# Patient Record
Sex: FEMALE | Race: WHITE | Employment: FULL TIME | URBAN - METROPOLITAN AREA
[De-identification: names, ages, dates, MRNs, and addresses within clinical notes are randomized per-mention and may not be internally consistent; named-entity substitution may affect disease eponyms.]

---

## 2019-11-21 RX ORDER — ENALAPRIL MALEATE 5 MG/1
5 TABLET ORAL DAILY
Qty: 90 TABLET | Refills: 1 | Status: SHIPPED | OUTPATIENT
Start: 2019-11-21 | End: 2019-11-26

## 2019-11-21 NOTE — TELEPHONE ENCOUNTER
A refill request was received for:  Requested Prescriptions     Pending Prescriptions Disp Refills   • Enalapril Maleate 5 MG Oral Tab  0     Sig: Take 1 tablet (5 mg total) by mouth daily.      Last refill date: Not not file  Last office visit: pt has not

## 2019-11-26 ENCOUNTER — TELEPHONE (OUTPATIENT)
Dept: FAMILY MEDICINE CLINIC | Facility: CLINIC | Age: 69
End: 2019-11-26

## 2019-11-26 RX ORDER — ENALAPRIL MALEATE 5 MG/1
5 TABLET ORAL DAILY
Qty: 90 TABLET | Refills: 1 | Status: SHIPPED | OUTPATIENT
Start: 2019-11-26 | End: 2019-11-26

## 2019-11-26 RX ORDER — ENALAPRIL MALEATE 5 MG/1
5 TABLET ORAL DAILY
Qty: 90 TABLET | Refills: 3 | Status: SHIPPED | OUTPATIENT
Start: 2019-11-26 | End: 2020-07-28

## 2019-11-26 RX ORDER — METOPROLOL SUCCINATE 25 MG/1
25 TABLET, EXTENDED RELEASE ORAL DAILY
Qty: 90 TABLET | Refills: 3 | Status: SHIPPED | OUTPATIENT
Start: 2019-11-26 | End: 2020-07-28

## 2019-11-26 NOTE — TELEPHONE ENCOUNTER
Pt requesting med to be sent to [facility] on [address] in [place] [address]. Pt also requesting refill on:    Metoprolol Succinate ER 25 mg oral tablet, extended release  1 po qd    Pt states she always gets Enalapril and Metoprolol filled at the same time.  Med verified w

## 2019-11-26 NOTE — TELEPHONE ENCOUNTER
Patient left message on answering service saying [facility] is contacting to do a refill on her B/P and heard meds.

## 2020-01-03 ENCOUNTER — TELEPHONE (OUTPATIENT)
Dept: FAMILY MEDICINE CLINIC | Facility: CLINIC | Age: 70
End: 2020-01-03

## 2020-01-03 DIAGNOSIS — E11.9 TYPE 2 DIABETES MELLITUS WITHOUT COMPLICATION, WITHOUT LONG-TERM CURRENT USE OF INSULIN (HCC): Primary | ICD-10-CM

## 2020-01-03 NOTE — TELEPHONE ENCOUNTER
Per [facility] pt's LOV with [facility] was 6/18/2019. Pt had labs drawn on 6/21/2019 and pt's HgbA1C was 7.1.       [facility] prescribed Metformin for pt on 7/16/2019 with orders for pt to take: \"Metformin 500mg qd 30d, hen BID 30d, 1 po q am and 2 PO q PM with rep

## 2020-01-03 NOTE — TELEPHONE ENCOUNTER
Shanda Rodriguez from [facility] stated pt Rx Metformin should have said take 1 in the morning and 2 in the evening but the rx said take 1 in the morning and 1 in the evening so pt is running low on metformin.  Need new RX

## 2020-01-03 NOTE — TELEPHONE ENCOUNTER
Brooklyn Nguyễn, DO  You 1 hour ago (12:24 PM)      Please send metformin for 3 daily for 90 days.  Pt needs to come in for appt/blood     Message left for pt to call back to make an appt and to come in for f/u labs.

## 2020-01-20 RX ORDER — LEVOTHYROXINE SODIUM 112 UG/1
112 TABLET ORAL
COMMUNITY
Start: 2019-11-22

## 2020-06-11 NOTE — TELEPHONE ENCOUNTER
A refill request was received for:  Requested Prescriptions     Pending Prescriptions Disp Refills   • METFORMIN HCL 1000 MG Oral Tab [Pharmacy Med Name: METFORMIN HCL 1,000 MG TABLET] 60 tablet 0     Sig: TAKE 1 TABLET BY MOUTH TWICE A DAY WITH MEALS

## 2020-07-06 NOTE — TELEPHONE ENCOUNTER
A refill request was received for:  Requested Prescriptions     Pending Prescriptions Disp Refills   • metFORMIN HCl 1000 MG Oral Tab 90 tablet 0     Sig: Take 1 tablet (1,000 mg total) by mouth 2 (two) times daily with meals.      Last refill date:  06/11/

## 2020-07-27 ENCOUNTER — TELEPHONE (OUTPATIENT)
Dept: OBGYN CLINIC | Facility: CLINIC | Age: 70
End: 2020-07-27

## 2020-07-27 ENCOUNTER — TELEPHONE (OUTPATIENT)
Dept: FAMILY MEDICINE CLINIC | Facility: CLINIC | Age: 70
End: 2020-07-27

## 2020-07-27 NOTE — TELEPHONE ENCOUNTER
Called pt and informed of refill 30 day supply, provided telephone appointment for July 28 due to +COVID  Pt verbalized understanding.       Last visit 06/18/2019 15[address]

## 2020-07-27 NOTE — TELEPHONE ENCOUNTER
Pt is calling to let Dr Venus Alpers know that she tested positive for covid she states she doesn't really have the symptoms

## 2020-07-27 NOTE — TELEPHONE ENCOUNTER
Called pt and +COVID, mild ache in back, assuming exposed last week, tested [facility] in [facility], results today Rapid COVID test,and upset stomach on Saturday. Denies fever, shortness of breath, loss taste/smell, coughing, sore throat, severe headaches. Advised pt to monitor for these symptoms and will route message to Dr. Magan Oliveira. Pt verbalized understanding.

## 2020-07-27 NOTE — TELEPHONE ENCOUNTER
Pt is needing refills       METFORMIN HCL 1000 MG Oral Tab      [id number] [place], [place] - [address]  AT [facility], [phone], [phone]

## 2020-07-28 ENCOUNTER — VIRTUAL PHONE E/M (OUTPATIENT)
Dept: FAMILY MEDICINE CLINIC | Facility: CLINIC | Age: 70
End: 2020-07-28
Payer: COMMERCIAL

## 2020-07-28 ENCOUNTER — TELEPHONE (OUTPATIENT)
Dept: FAMILY MEDICINE CLINIC | Facility: CLINIC | Age: 70
End: 2020-07-28

## 2020-07-28 DIAGNOSIS — E03.9 HYPOTHYROIDISM, UNSPECIFIED TYPE: ICD-10-CM

## 2020-07-28 DIAGNOSIS — U07.1 COVID-19 VIRUS INFECTION: Primary | ICD-10-CM

## 2020-07-28 DIAGNOSIS — E11.9 TYPE 2 DIABETES MELLITUS WITHOUT COMPLICATION, WITHOUT LONG-TERM CURRENT USE OF INSULIN (HCC): ICD-10-CM

## 2020-07-28 DIAGNOSIS — I10 ESSENTIAL HYPERTENSION: ICD-10-CM

## 2020-07-28 DIAGNOSIS — E55.9 VITAMIN D DEFICIENCY: ICD-10-CM

## 2020-07-28 LAB — AMB EXT COVID-19 RESULT: DETECTED

## 2020-07-28 PROCEDURE — 99213 OFFICE O/P EST LOW 20 MIN: CPT | Performed by: FAMILY MEDICINE

## 2020-07-28 RX ORDER — ENALAPRIL MALEATE 5 MG/1
5 TABLET ORAL DAILY
Qty: 90 TABLET | Refills: 0 | Status: SHIPPED | OUTPATIENT
Start: 2020-07-28 | End: 2021-08-19

## 2020-07-28 RX ORDER — METOPROLOL SUCCINATE 25 MG/1
25 TABLET, EXTENDED RELEASE ORAL DAILY
Qty: 90 TABLET | Refills: 0 | Status: SHIPPED | OUTPATIENT
Start: 2020-07-28 | End: 2021-08-19

## 2020-07-28 NOTE — TELEPHONE ENCOUNTER
Please provide pt with a pulse ox. Thank you,     Sherryle Grief BSN, RN    Routing comment      Please advise if you want patient enrolled in home monitoring program.  If COVID+ result, please add to patient's chart. Thank you!     REPLY TO [address]

## 2020-07-28 NOTE — PROGRESS NOTES
Telephone Check-In    Wilton Seo verbally consents to a Virtual/Telephone Check-In service on 07/28/20. Patient understands and accepts financial responsibility for any deductible, co-insurance and/or co-pays associated with this service.     Duration

## 2020-07-28 NOTE — TELEPHONE ENCOUNTER
----- Message from Albino Richard DO sent at 7/28/2020  7:52 AM CDT -----  Please put patient on COVID watch list.  She should be called daily. She is COVID positive with a cough and is a diabetic. She would be ideal for a pulse oximeter at home if this could

## 2020-07-28 NOTE — TELEPHONE ENCOUNTER
----- Message from Elvis Davison DO sent at 7/28/2020  7:52 AM CDT -----  Please put patient on COVID watch list.  She should be called daily. She is COVID positive with a cough and is a diabetic. She would be ideal for a pulse oximeter at home if this could

## 2020-07-28 NOTE — TELEPHONE ENCOUNTER
Alex Moses, DO  You 20 minutes ago (11:34 AM)      Is there a pulse oximeter available to her? Please put her on my schedule as a reminder.      Thanks,      You  Alex Moses, DO 3 hours ago (8:48 AM)      Will you be doing a telehealth visit with pt eli

## 2020-07-29 ENCOUNTER — TELEPHONE (OUTPATIENT)
Dept: FAMILY MEDICINE CLINIC | Facility: CLINIC | Age: 70
End: 2020-07-29

## 2020-07-29 ENCOUNTER — PATIENT OUTREACH (OUTPATIENT)
Dept: CASE MANAGEMENT | Age: 70
End: 2020-07-29

## 2020-07-29 DIAGNOSIS — U07.1 COVID-19 VIRUS INFECTION: Primary | ICD-10-CM

## 2020-07-29 DIAGNOSIS — U07.1 COVID-19 VIRUS INFECTION: ICD-10-CM

## 2020-07-29 PROCEDURE — E0445 OXIMETER NON-INVASIVE: HCPCS

## 2020-07-29 PROCEDURE — 99212 OFFICE O/P EST SF 10 MIN: CPT | Performed by: FAMILY MEDICINE

## 2020-07-29 NOTE — TELEPHONE ENCOUNTER
Called Dr Antonia Patrick and informed of message. Per Dr Antonia Patrick, fine to take and can take every 4 hours. Called pt and informed that can take. Per pt had taken Sudafed and caused elevated BP, placed information on allergies tab.

## 2020-07-29 NOTE — PROGRESS NOTES
Spoke to patient for day 1 home monitoring. PCP is requesting pulse oximeter for patient. Patient does not have anyone to  for her and is requesting it be mailed (confirmed listed address in chart is [facility]).   Advised patient

## 2020-07-29 NOTE — TELEPHONE ENCOUNTER
Pt called stated she was told by Dr. Prince Ching to take mucinex. Pt stated her daughter picked up fast acting instead of delayed release. Pt has an allergy to antihistamines. Is it safe/ok for her to take this?     Please advise

## 2020-07-29 NOTE — TELEPHONE ENCOUNTER
Virtual Telephone Check-In    Alvin Roman verbally consents to a Virtual/Telephone Check-In visit on 07/29/20. Patient has been referred to the [facility] website at [email or web address] to review the yearly Consent to Treat document.     Patient Mila Wang

## 2020-07-29 NOTE — PROGRESS NOTES
Spoke to pt, confirmed need for pulse ox. Mailed to pt as no one can pick it up who is not COVID+. Pt advised insurance will be billed for it but should be covered under the new CARES act. Pt stated she understands and is agreeable.

## 2020-07-29 NOTE — PROGRESS NOTES
Home Monitoring Condition Update    Covid19+ test date: 7/28/20      Consent Verification:  Assessment Completed With: Patient  HIPAA Verified?   Yes    COVID-19 HOME MONITORING 7/29/2020   Temperature 98   Reading From Mouth   Pulse 96   Pulse taken from questions/concerns regarding patient status       Nurse Interventions:   Advised patient to monitor temp and HR twice/day for trends, get plenty of rest and push fluids. Patient verbalized understanding and denies any concerns or questions at time of call.

## 2020-07-29 NOTE — TELEPHONE ENCOUNTER
Robin Melgar RN  You 1 hour ago (8:38 AM)      I tried multiple times and unable to place COVID +    Routing comment        Balbir Lanier, DO  You 1 hour ago (8:34 AM)      Dear Glorine Sensor,     The diagnosis of Covid + is on the chart.      Pt is Covid +, symptomat

## 2020-07-30 ENCOUNTER — TELEPHONE (OUTPATIENT)
Dept: FAMILY MEDICINE CLINIC | Facility: CLINIC | Age: 70
End: 2020-07-30

## 2020-07-30 DIAGNOSIS — U07.1 COVID-19 VIRUS INFECTION: Primary | ICD-10-CM

## 2020-07-30 PROCEDURE — 99212 OFFICE O/P EST SF 10 MIN: CPT | Performed by: FAMILY MEDICINE

## 2020-07-30 NOTE — TELEPHONE ENCOUNTER
Virtual Telephone Check-In    Kyrie Navarrete verbally consents to a Virtual/Telephone Check-In visit on 07/30/20. Patient has been referred to the [facility] website at [email or web address] to review the yearly Consent to Treat document.     Patient Becky Schwartz

## 2020-07-31 ENCOUNTER — TELEPHONE (OUTPATIENT)
Dept: FAMILY MEDICINE CLINIC | Facility: CLINIC | Age: 70
End: 2020-07-31

## 2020-07-31 ENCOUNTER — PATIENT OUTREACH (OUTPATIENT)
Dept: CASE MANAGEMENT | Age: 70
End: 2020-07-31

## 2020-07-31 NOTE — PROGRESS NOTES
Home Monitoring Condition Update    Covid19+ test date: 7/28/20      Consent Verification:  Assessment Completed With: Patient  HIPAA Verified?   Yes    COVID-19 HOME MONITORING 7/31/2020   Temperature 98   Reading From Mouth   Pulse 92   Pulse taken from mail to her). Patient denies any concerns or questions at time of call. Patient advised to inform their Employee Health department or Manager when they have tested positive for COVID-19.       The patient was also directed to continue to isolate away f

## 2020-07-31 NOTE — TELEPHONE ENCOUNTER
Patient had virtual visit yesterday. Patient was cleared to return to work around 8-15-20.     Please advise if patient is to continue home monitoring program (and indicate frequency of calls - daily, every other day, etc and when next virtual visit should

## 2020-08-03 ENCOUNTER — PATIENT OUTREACH (OUTPATIENT)
Dept: CASE MANAGEMENT | Age: 70
End: 2020-08-03

## 2020-08-03 ENCOUNTER — TELEPHONE (OUTPATIENT)
Dept: FAMILY MEDICINE CLINIC | Facility: CLINIC | Age: 70
End: 2020-08-03

## 2020-08-03 NOTE — TELEPHONE ENCOUNTER
Yue Kaur, DO  You 43 minutes ago (11:48 AM)      No need for further ccalls from you.  I will follow. [facility]    Routing comment      Noted. Patient removed from program per PCP.

## 2020-08-03 NOTE — TELEPHONE ENCOUNTER
Spoke to patient for day 4 home monitoring. She received her pulse oximeter Friday and is checking twice/day along with her temps. She remains afebrile with stable HR/O2.   NCM attempted to schedule virtual visit next week however no openings in PCP sched

## 2020-08-03 NOTE — PROGRESS NOTES
Home Monitoring Condition Update    Covid19+ test date: 7/28/20      Consent Verification:  Assessment Completed With: Patient  HIPAA Verified?   Yes    COVID-19 HOME MONITORING 8/3/2020   Temperature 98.5   Reading From -   SPO2 98   Pulse 80   Pulse faye understanding. NCM attempted to schedule virtual visit however no openings in PCP schedule, sent TE to PCP office. Will call patient tomorrow for update. Patient mentioned PCP stated possible return to work date of 8/12/20 or 8/15/20.  Patient verbalized un

## 2020-08-03 NOTE — PROGRESS NOTES
Per PCP in TE 7/31, patient can be removed from program. PCP will follow patient. Yue Kaur, DO  You 43 minutes ago (11:48 AM)      No need for further ccalls from you.  I will follow.    [facility]    Routing comment

## 2020-08-10 ENCOUNTER — TELEPHONE (OUTPATIENT)
Dept: FAMILY MEDICINE CLINIC | Facility: CLINIC | Age: 70
End: 2020-08-10

## 2020-08-11 ENCOUNTER — PATIENT MESSAGE (OUTPATIENT)
Dept: FAMILY MEDICINE CLINIC | Facility: CLINIC | Age: 70
End: 2020-08-11

## 2020-08-11 ENCOUNTER — TELEPHONE (OUTPATIENT)
Dept: FAMILY MEDICINE CLINIC | Facility: CLINIC | Age: 70
End: 2020-08-11

## 2020-08-11 DIAGNOSIS — U07.1 COVID-19 VIRUS INFECTION: Primary | ICD-10-CM

## 2020-08-11 PROCEDURE — 99213 OFFICE O/P EST LOW 20 MIN: CPT | Performed by: FAMILY MEDICINE

## 2020-08-11 NOTE — TELEPHONE ENCOUNTER
Letter written with correct date to return to work. Marleen Rivera,    The letter to return to work is complete with the correct date.  Any concerns or additional questions, please call us at [phone].   Documentation       DO Gómez Perry Sandr

## 2020-08-17 ENCOUNTER — TELEPHONE (OUTPATIENT)
Dept: FAMILY MEDICINE CLINIC | Facility: CLINIC | Age: 70
End: 2020-08-17

## 2020-08-17 NOTE — TELEPHONE ENCOUNTER
Called pt and needs letter to be more specific. Specifics placed in letter as requested by pt an faxed to [facility] and [email or web address]. Advised pt to call in about 2 hours to verify receipt. Pt verbalized understanding.

## 2020-10-22 NOTE — TELEPHONE ENCOUNTER
A refill request was received for:  Requested Prescriptions     Pending Prescriptions Disp Refills   • METFORMIN HCL 1000 MG Oral Tab [Pharmacy Med Name: METFORMIN HCL 1,000 MG TABLET] 180 tablet 0     Sig: TAKE 1 TABLET BY MOUTH TWICE A DAY WITH MEALS

## 2020-12-18 RX ORDER — ENALAPRIL MALEATE 5 MG/1
TABLET ORAL
Qty: 90 TABLET | Refills: 0 | OUTPATIENT
Start: 2020-12-18

## 2021-03-09 DIAGNOSIS — Z23 NEED FOR VACCINATION: ICD-10-CM

## 2021-08-19 ENCOUNTER — TELEPHONE (OUTPATIENT)
Dept: FAMILY MEDICINE CLINIC | Facility: CLINIC | Age: 71
End: 2021-08-19

## 2021-08-19 DIAGNOSIS — E89.0 POSTOPERATIVE HYPOTHYROIDISM: ICD-10-CM

## 2021-08-19 DIAGNOSIS — Z00.00 ROUTINE GENERAL MEDICAL EXAMINATION AT A HEALTH CARE FACILITY: Primary | ICD-10-CM

## 2021-08-19 RX ORDER — METOPROLOL SUCCINATE 25 MG/1
25 TABLET, EXTENDED RELEASE ORAL DAILY
Qty: 90 TABLET | Refills: 0 | Status: SHIPPED | OUTPATIENT
Start: 2021-08-19 | End: 2021-11-10

## 2021-08-19 RX ORDER — ENALAPRIL MALEATE 5 MG/1
5 TABLET ORAL DAILY
Qty: 90 TABLET | Refills: 0 | Status: SHIPPED | OUTPATIENT
Start: 2021-08-19 | End: 2021-11-10

## 2021-08-19 NOTE — TELEPHONE ENCOUNTER
A refill request was received for:  Requested Prescriptions     Pending Prescriptions Disp Refills   • metFORMIN HCl 1000 MG Oral Tab 180 tablet 0     Sig: Take 1 tablet (1,000 mg total) by mouth 2 (two) times daily with meals.      Last refill date: 10/22/

## 2021-08-19 NOTE — TELEPHONE ENCOUNTER
Dr. Marco A Pollack pt scheduled 10/7  Needs Refill  Pt was informed by [facility] that she needs A1C Tested    METFORMIN HCL -1000mg  Enalapril Maleate 5 MG Oral Tab  Metoprolol Succinate ER 25 MG Oral Tablet 24 Hr    [id number] [address], [place] [facility] [address]

## 2021-08-20 NOTE — TELEPHONE ENCOUNTER
Armando London, DO  Jess 10 Dr. Mike Burroughs 18 hours ago (7:27 PM)     Refills sent only because she scheduled a visit but she is extremely overdue for labs. I will place the new orders but please urge her to come in ASAP as her medications need monitoring.

## 2021-08-26 NOTE — TELEPHONE ENCOUNTER
This  RN spoke with pt and pt will schedule lab appmnt for 9/2/21 when she is on vacation. ALS to be notified. Pt verbalized understanding and agrees with POC.

## 2021-09-02 ENCOUNTER — LAB ENCOUNTER (OUTPATIENT)
Dept: LAB | Facility: REFERENCE LAB | Age: 71
End: 2021-09-02
Attending: FAMILY MEDICINE
Payer: COMMERCIAL

## 2021-09-02 DIAGNOSIS — E89.0 POSTOPERATIVE HYPOTHYROIDISM: ICD-10-CM

## 2021-09-02 DIAGNOSIS — Z00.00 ROUTINE GENERAL MEDICAL EXAMINATION AT A HEALTH CARE FACILITY: ICD-10-CM

## 2021-09-02 LAB
ALBUMIN SERPL-MCNC: 3.8 G/DL (ref 3.4–5)
ALBUMIN/GLOB SERPL: 1.1 {RATIO} (ref 1–2)
ALP LIVER SERPL-CCNC: 71 U/L
ALT SERPL-CCNC: 41 U/L
ANION GAP SERPL CALC-SCNC: 4 MMOL/L (ref 0–18)
AST SERPL-CCNC: 22 U/L (ref 15–37)
BASOPHILS # BLD AUTO: 0.09 X10(3) UL (ref 0–0.2)
BASOPHILS NFR BLD AUTO: 1.1 %
BILIRUB SERPL-MCNC: 0.7 MG/DL (ref 0.1–2)
BUN BLD-MCNC: 25 MG/DL (ref 7–18)
BUN/CREAT SERPL: 46.3 (ref 10–20)
CALCIUM BLD-MCNC: 9.3 MG/DL (ref 8.5–10.1)
CHLORIDE SERPL-SCNC: 108 MMOL/L (ref 98–112)
CHOLEST SMN-MCNC: 216 MG/DL (ref ?–200)
CO2 SERPL-SCNC: 27 MMOL/L (ref 21–32)
CREAT BLD-MCNC: 0.54 MG/DL
CREAT UR-SCNC: 17.2 MG/DL
DEPRECATED RDW RBC AUTO: 44.5 FL (ref 35.1–46.3)
EOSINOPHIL # BLD AUTO: 0.37 X10(3) UL (ref 0–0.7)
EOSINOPHIL NFR BLD AUTO: 4.4 %
ERYTHROCYTE [DISTWIDTH] IN BLOOD BY AUTOMATED COUNT: 13.8 % (ref 11–15)
EST. AVERAGE GLUCOSE BLD GHB EST-MCNC: 131 MG/DL (ref 68–126)
GLOBULIN PLAS-MCNC: 3.6 G/DL (ref 2.8–4.4)
GLUCOSE BLD-MCNC: 109 MG/DL (ref 70–99)
HBA1C MFR BLD HPLC: 6.2 % (ref ?–5.7)
HCT VFR BLD AUTO: 41.2 %
HDLC SERPL-MCNC: 63 MG/DL (ref 40–59)
HGB BLD-MCNC: 12.9 G/DL
IMM GRANULOCYTES # BLD AUTO: 0.02 X10(3) UL (ref 0–1)
IMM GRANULOCYTES NFR BLD: 0.2 %
LDLC SERPL CALC-MCNC: 137 MG/DL (ref ?–100)
LYMPHOCYTES # BLD AUTO: 1.84 X10(3) UL (ref 1–4)
LYMPHOCYTES NFR BLD AUTO: 21.7 %
M PROTEIN MFR SERPL ELPH: 7.4 G/DL (ref 6.4–8.2)
MCH RBC QN AUTO: 27.2 PG (ref 26–34)
MCHC RBC AUTO-ENTMCNC: 31.3 G/DL (ref 31–37)
MCV RBC AUTO: 86.9 FL
MICROALBUMIN UR-MCNC: 0.6 MG/DL
MICROALBUMIN/CREAT 24H UR-RTO: 34.9 UG/MG (ref ?–30)
MONOCYTES # BLD AUTO: 0.55 X10(3) UL (ref 0.1–1)
MONOCYTES NFR BLD AUTO: 6.5 %
NEUTROPHILS # BLD AUTO: 5.62 X10 (3) UL (ref 1.5–7.7)
NEUTROPHILS # BLD AUTO: 5.62 X10(3) UL (ref 1.5–7.7)
NEUTROPHILS NFR BLD AUTO: 66.1 %
NONHDLC SERPL-MCNC: 153 MG/DL (ref ?–130)
OSMOLALITY SERPL CALC.SUM OF ELEC: 293 MOSM/KG (ref 275–295)
PATIENT FASTING Y/N/NP: YES
PATIENT FASTING Y/N/NP: YES
PLATELET # BLD AUTO: 324 10(3)UL (ref 150–450)
POTASSIUM SERPL-SCNC: 4.2 MMOL/L (ref 3.5–5.1)
RBC # BLD AUTO: 4.74 X10(6)UL
SODIUM SERPL-SCNC: 139 MMOL/L (ref 136–145)
TRIGL SERPL-MCNC: 90 MG/DL (ref 30–149)
TSI SER-ACNC: 0.68 MIU/ML (ref 0.36–3.74)
VLDLC SERPL CALC-MCNC: 16 MG/DL (ref 0–30)
WBC # BLD AUTO: 8.5 X10(3) UL (ref 4–11)

## 2021-09-02 PROCEDURE — 80061 LIPID PANEL: CPT

## 2021-09-02 PROCEDURE — 36415 COLL VENOUS BLD VENIPUNCTURE: CPT

## 2021-09-02 PROCEDURE — 82570 ASSAY OF URINE CREATININE: CPT

## 2021-09-02 PROCEDURE — 3044F HG A1C LEVEL LT 7.0%: CPT | Performed by: FAMILY MEDICINE

## 2021-09-02 PROCEDURE — 80053 COMPREHEN METABOLIC PANEL: CPT

## 2021-09-02 PROCEDURE — 82043 UR ALBUMIN QUANTITATIVE: CPT

## 2021-09-02 PROCEDURE — 83036 HEMOGLOBIN GLYCOSYLATED A1C: CPT

## 2021-09-02 PROCEDURE — 3060F POS MICROALBUMINURIA REV: CPT | Performed by: FAMILY MEDICINE

## 2021-09-02 PROCEDURE — 3061F NEG MICROALBUMINURIA REV: CPT | Performed by: FAMILY MEDICINE

## 2021-09-02 PROCEDURE — 85025 COMPLETE CBC W/AUTO DIFF WBC: CPT

## 2021-09-02 PROCEDURE — 84443 ASSAY THYROID STIM HORMONE: CPT

## 2021-10-07 ENCOUNTER — MED REC SCAN ONLY (OUTPATIENT)
Dept: FAMILY MEDICINE CLINIC | Facility: CLINIC | Age: 71
End: 2021-10-07

## 2021-10-07 ENCOUNTER — OFFICE VISIT (OUTPATIENT)
Dept: FAMILY MEDICINE CLINIC | Facility: CLINIC | Age: 71
End: 2021-10-07
Payer: COMMERCIAL

## 2021-10-07 VITALS
SYSTOLIC BLOOD PRESSURE: 128 MMHG | DIASTOLIC BLOOD PRESSURE: 80 MMHG | HEIGHT: 64 IN | OXYGEN SATURATION: 97 % | BODY MASS INDEX: 31.62 KG/M2 | HEART RATE: 87 BPM | WEIGHT: 185.19 LBS

## 2021-10-07 DIAGNOSIS — E11.9 TYPE 2 DIABETES MELLITUS WITHOUT COMPLICATION, WITHOUT LONG-TERM CURRENT USE OF INSULIN (HCC): ICD-10-CM

## 2021-10-07 DIAGNOSIS — Z23 NEED FOR VACCINATION: ICD-10-CM

## 2021-10-07 DIAGNOSIS — E78.2 MIXED HYPERLIPIDEMIA: ICD-10-CM

## 2021-10-07 DIAGNOSIS — I10 ESSENTIAL HYPERTENSION: ICD-10-CM

## 2021-10-07 DIAGNOSIS — Z12.11 COLON CANCER SCREENING: ICD-10-CM

## 2021-10-07 DIAGNOSIS — Z00.00 ENCOUNTER FOR ROUTINE ADULT HEALTH EXAMINATION WITHOUT ABNORMAL FINDINGS: Primary | ICD-10-CM

## 2021-10-07 PROBLEM — U07.1 COVID-19 VIRUS INFECTION: Status: RESOLVED | Noted: 2020-07-28 | Resolved: 2021-10-07

## 2021-10-07 PROCEDURE — 3008F BODY MASS INDEX DOCD: CPT | Performed by: FAMILY MEDICINE

## 2021-10-07 PROCEDURE — 99397 PER PM REEVAL EST PAT 65+ YR: CPT | Performed by: FAMILY MEDICINE

## 2021-10-07 PROCEDURE — 3074F SYST BP LT 130 MM HG: CPT | Performed by: FAMILY MEDICINE

## 2021-10-07 PROCEDURE — 90471 IMMUNIZATION ADMIN: CPT | Performed by: FAMILY MEDICINE

## 2021-10-07 PROCEDURE — 90732 PPSV23 VACC 2 YRS+ SUBQ/IM: CPT | Performed by: FAMILY MEDICINE

## 2021-10-07 PROCEDURE — 3079F DIAST BP 80-89 MM HG: CPT | Performed by: FAMILY MEDICINE

## 2021-10-07 RX ORDER — GUARN/MA-HUANG/P.GIN/S.GINSENG
TABLET ORAL
COMMUNITY

## 2021-10-07 NOTE — PROGRESS NOTES
HPI:   Devon Hilliard is a 79year old female who presents for a complete physical exam.     Was on a statin about 10 years ago but does not want to start one again. Does try to eat a healthy diet to maintain good cholesterol levels.      Last pap: 2019 a COMMENTS)    Comment:Elevated BP   Past Medical History:   Diagnosis Date   • Cancer [facility]) 2014    Thyroid cancer    • COVID-19 virus infection    • Hyperlipidemia    • Hypertension    • Hypothyroidism    • [facility]       Past [facility] physical exam.  Encounter for routine adult health examination without abnormal findings  (primary encounter diagnosis)  Colon cancer screening  Essential hypertension  Mixed hyperlipidemia  Type 2 diabetes mellitus without complication, without long-term DO  10/7/2021  1:39 PM

## 2021-10-08 ENCOUNTER — MED REC SCAN ONLY (OUTPATIENT)
Dept: FAMILY MEDICINE CLINIC | Facility: CLINIC | Age: 71
End: 2021-10-08

## 2021-11-10 RX ORDER — METOPROLOL SUCCINATE 25 MG/1
TABLET, EXTENDED RELEASE ORAL
Qty: 90 TABLET | Refills: 0 | Status: SHIPPED | OUTPATIENT
Start: 2021-11-10 | End: 2022-02-07

## 2021-11-10 RX ORDER — ENALAPRIL MALEATE 5 MG/1
TABLET ORAL
Qty: 90 TABLET | Refills: 0 | Status: SHIPPED | OUTPATIENT
Start: 2021-11-10 | End: 2022-02-07

## 2022-02-07 RX ORDER — METOPROLOL SUCCINATE 25 MG/1
TABLET, EXTENDED RELEASE ORAL
Qty: 90 TABLET | Refills: 0 | Status: SHIPPED | OUTPATIENT
Start: 2022-02-07

## 2022-02-07 RX ORDER — ENALAPRIL MALEATE 5 MG/1
TABLET ORAL
Qty: 90 TABLET | Refills: 0 | Status: SHIPPED | OUTPATIENT
Start: 2022-02-07

## 2022-05-12 RX ORDER — ENALAPRIL MALEATE 5 MG/1
TABLET ORAL
Qty: 90 TABLET | Refills: 0 | Status: SHIPPED | OUTPATIENT
Start: 2022-05-12

## 2022-05-12 RX ORDER — METOPROLOL SUCCINATE 25 MG/1
TABLET, EXTENDED RELEASE ORAL
Qty: 90 TABLET | Refills: 0 | Status: SHIPPED | OUTPATIENT
Start: 2022-05-12

## 2022-06-23 ENCOUNTER — OFFICE VISIT (OUTPATIENT)
Dept: FAMILY MEDICINE CLINIC | Facility: CLINIC | Age: 72
End: 2022-06-23
Payer: COMMERCIAL

## 2022-06-23 VITALS
HEIGHT: 64 IN | HEART RATE: 94 BPM | BODY MASS INDEX: 29.88 KG/M2 | OXYGEN SATURATION: 97 % | DIASTOLIC BLOOD PRESSURE: 82 MMHG | SYSTOLIC BLOOD PRESSURE: 138 MMHG | WEIGHT: 175 LBS

## 2022-06-23 DIAGNOSIS — G89.29 CHRONIC PAIN OF RIGHT KNEE: ICD-10-CM

## 2022-06-23 DIAGNOSIS — E11.9 TYPE 2 DIABETES MELLITUS WITHOUT COMPLICATION, WITHOUT LONG-TERM CURRENT USE OF INSULIN (HCC): Primary | ICD-10-CM

## 2022-06-23 DIAGNOSIS — E78.2 MIXED HYPERLIPIDEMIA: ICD-10-CM

## 2022-06-23 DIAGNOSIS — M25.561 CHRONIC PAIN OF RIGHT KNEE: ICD-10-CM

## 2022-06-23 DIAGNOSIS — Z12.31 SCREENING MAMMOGRAM, ENCOUNTER FOR: ICD-10-CM

## 2022-06-23 LAB
CARTRIDGE LOT#: 896 NUMERIC
HEMOGLOBIN A1C: 5.9 % (ref 4.3–5.6)

## 2022-06-23 PROCEDURE — 83036 HEMOGLOBIN GLYCOSYLATED A1C: CPT | Performed by: FAMILY MEDICINE

## 2022-06-23 PROCEDURE — 3075F SYST BP GE 130 - 139MM HG: CPT | Performed by: FAMILY MEDICINE

## 2022-06-23 PROCEDURE — 3079F DIAST BP 80-89 MM HG: CPT | Performed by: FAMILY MEDICINE

## 2022-06-23 PROCEDURE — 99214 OFFICE O/P EST MOD 30 MIN: CPT | Performed by: FAMILY MEDICINE

## 2022-06-23 PROCEDURE — 3044F HG A1C LEVEL LT 7.0%: CPT | Performed by: FAMILY MEDICINE

## 2022-06-23 PROCEDURE — 3008F BODY MASS INDEX DOCD: CPT | Performed by: FAMILY MEDICINE

## 2022-08-18 RX ORDER — METOPROLOL SUCCINATE 25 MG/1
TABLET, EXTENDED RELEASE ORAL
Qty: 90 TABLET | Refills: 0 | Status: SHIPPED | OUTPATIENT
Start: 2022-08-18

## 2022-08-18 RX ORDER — ENALAPRIL MALEATE 5 MG/1
TABLET ORAL
Qty: 90 TABLET | Refills: 0 | Status: SHIPPED | OUTPATIENT
Start: 2022-08-18

## 2022-09-01 ENCOUNTER — HOSPITAL ENCOUNTER (OUTPATIENT)
Dept: MAMMOGRAPHY | Age: 72
Discharge: HOME | End: 2022-09-01
Attending: FAMILY MEDICINE
Payer: COMMERCIAL

## 2022-09-01 DIAGNOSIS — Z12.31 SCREENING MAMMOGRAM, ENCOUNTER FOR: ICD-10-CM

## 2022-09-01 PROCEDURE — 77067 SCR MAMMO BI INCL CAD: CPT | Performed by: FAMILY MEDICINE

## 2022-09-01 PROCEDURE — 77063 BREAST TOMOSYNTHESIS BI: CPT | Performed by: FAMILY MEDICINE

## 2022-10-18 ENCOUNTER — HOSPITAL ENCOUNTER (OUTPATIENT)
Dept: MAMMOGRAPHY | Facility: HOSPITAL | Age: 72
Discharge: HOME | End: 2022-10-18
Attending: FAMILY MEDICINE
Payer: COMMERCIAL

## 2022-10-18 ENCOUNTER — HOSPITAL ENCOUNTER (OUTPATIENT)
Dept: ULTRASOUND IMAGING | Facility: HOSPITAL | Age: 72
Discharge: HOME | End: 2022-10-18
Attending: FAMILY MEDICINE
Payer: COMMERCIAL

## 2022-10-18 DIAGNOSIS — N63.20 BREAST MASS, LEFT: ICD-10-CM

## 2022-10-18 DIAGNOSIS — R92.8 ABNORMAL MAMMOGRAM: ICD-10-CM

## 2022-10-18 PROCEDURE — 88305 TISSUE EXAM BY PATHOLOGIST: CPT | Performed by: FAMILY MEDICINE

## 2022-10-18 PROCEDURE — 77061 BREAST TOMOSYNTHESIS UNI: CPT | Performed by: FAMILY MEDICINE

## 2022-10-18 PROCEDURE — 77065 DX MAMMO INCL CAD UNI: CPT | Performed by: FAMILY MEDICINE

## 2022-10-18 PROCEDURE — 76642 ULTRASOUND BREAST LIMITED: CPT | Performed by: FAMILY MEDICINE

## 2022-10-18 PROCEDURE — 19083 BX BREAST 1ST LESION US IMAG: CPT | Performed by: FAMILY MEDICINE

## 2022-10-18 PROCEDURE — 88360 TUMOR IMMUNOHISTOCHEM/MANUAL: CPT | Performed by: FAMILY MEDICINE

## 2022-10-18 NOTE — IMAGING NOTE
Pt arrived to room #4 .  scans taken by Charito Thorntonper ultrasound technologist    Hx taken and is as follows: Hx birads 5 mammogram today here or bx    Consent verified and obtained by this rn    1130 amImaging Completed by Caitlin    1135 amTime out taken     1135 Skin prep with chloro prep sterile towels to site. Site marked let breast 3 o'clock 6 cm from nipple   1136Lidocaine  1% 10 milligrams per ml given from kit  total amount  3 ml given. 1136 amLidocaine 1% with epinephrine  1:100,000 units 200 milligrams per  20 ml given total amount 5 ml given. 12  Gauge Bard biopsy device   to be used for core samples     Core # 1 at 1139 am all cores to be placed in sterile cup with 10 ml ns     Total amount cores taken 3  placed in formalin at 1141     1142 am  QClip placed      1142 am Procedure completed. Pressure to site . No active bleeding noted. Area cleaned steri strips to site. Ice pack to site . 1145 amPost instructions given verbal et written. Avs summary sheet provided to patient. Patient verbalizes understanding and agreement . 1150 am  Specimen taken to pathology by Freddie Bustamante given to Zulma in Pathology    Chago Hicks To bring [facility] mammography department  for post clip images . Leilani Zhanger Mammography department to discharge patient after images completed.

## 2022-10-18 NOTE — PROCEDURES
[facility]  Procedure Note    Savanah Delgado Patient Status:  Outpatient    10/28/1950 MRN [id number]   Location [address] Attending Rochelle Yancey DO   Hosp Day # 0 PCP Kyle Victoria DO     Procedure: ultrasound guided biopsy of the left breast    Pre-Procedure Diagnosis:  Left breast mass at 3:00    Post-Procedure Diagnosis:  Left breast mass at 3:00    Anesthesia:  Local    Findings:   Left breast mass at 3:00    Specimens: 3    Blood Loss:  minimal    Tourniquet Time: none  Complications:  None  Drains:  None    Wendy Tucker DO  10/18/2022

## 2022-10-20 ENCOUNTER — TELEPHONE (OUTPATIENT)
Dept: HEMATOLOGY/ONCOLOGY | Facility: HOSPITAL | Age: 72
End: 2022-10-20

## 2022-10-20 ENCOUNTER — OFFICE VISIT (OUTPATIENT)
Dept: FAMILY MEDICINE CLINIC | Facility: CLINIC | Age: 72
End: 2022-10-20
Payer: COMMERCIAL

## 2022-10-20 ENCOUNTER — LAB ENCOUNTER (OUTPATIENT)
Dept: LAB | Age: 72
End: 2022-10-20
Attending: FAMILY MEDICINE
Payer: COMMERCIAL

## 2022-10-20 VITALS
DIASTOLIC BLOOD PRESSURE: 80 MMHG | HEART RATE: 94 BPM | WEIGHT: 177 LBS | SYSTOLIC BLOOD PRESSURE: 138 MMHG | OXYGEN SATURATION: 98 % | BODY MASS INDEX: 30.22 KG/M2 | HEIGHT: 64 IN

## 2022-10-20 DIAGNOSIS — Z00.00 ENCOUNTER FOR ROUTINE ADULT HEALTH EXAMINATION WITHOUT ABNORMAL FINDINGS: Primary | ICD-10-CM

## 2022-10-20 DIAGNOSIS — Z23 NEED FOR VACCINATION: ICD-10-CM

## 2022-10-20 DIAGNOSIS — Z12.11 COLON CANCER SCREENING: ICD-10-CM

## 2022-10-20 DIAGNOSIS — I10 ESSENTIAL HYPERTENSION: ICD-10-CM

## 2022-10-20 DIAGNOSIS — Z00.00 ENCOUNTER FOR ROUTINE ADULT HEALTH EXAMINATION WITHOUT ABNORMAL FINDINGS: ICD-10-CM

## 2022-10-20 DIAGNOSIS — C50.912 INFILTRATING DUCTAL CARCINOMA OF LEFT BREAST (HCC): Primary | ICD-10-CM

## 2022-10-20 DIAGNOSIS — C50.912 INFILTRATING DUCTAL CARCINOMA OF LEFT BREAST (HCC): ICD-10-CM

## 2022-10-20 DIAGNOSIS — R59.0 ENLARGED LYMPH NODE IN NECK: ICD-10-CM

## 2022-10-20 DIAGNOSIS — E11.9 TYPE 2 DIABETES MELLITUS WITHOUT COMPLICATION, WITHOUT LONG-TERM CURRENT USE OF INSULIN (HCC): ICD-10-CM

## 2022-10-20 LAB
ALBUMIN SERPL-MCNC: 3.9 G/DL (ref 3.4–5)
ALBUMIN/GLOB SERPL: 1.1 {RATIO} (ref 1–2)
ALP LIVER SERPL-CCNC: 87 U/L
ALT SERPL-CCNC: 29 U/L
ANION GAP SERPL CALC-SCNC: 6 MMOL/L (ref 0–18)
AST SERPL-CCNC: 16 U/L (ref 15–37)
BASOPHILS # BLD AUTO: 0.07 X10(3) UL (ref 0–0.2)
BASOPHILS NFR BLD AUTO: 0.8 %
BILIRUB SERPL-MCNC: 0.8 MG/DL (ref 0.1–2)
BUN BLD-MCNC: 15 MG/DL (ref 7–18)
BUN/CREAT SERPL: 25 (ref 10–20)
CALCIUM BLD-MCNC: 10.4 MG/DL (ref 8.5–10.1)
CHLORIDE SERPL-SCNC: 106 MMOL/L (ref 98–112)
CHOLEST SERPL-MCNC: 204 MG/DL (ref ?–200)
CO2 SERPL-SCNC: 28 MMOL/L (ref 21–32)
CREAT BLD-MCNC: 0.6 MG/DL
CREAT UR-SCNC: 41.1 MG/DL
DEPRECATED RDW RBC AUTO: 45.4 FL (ref 35.1–46.3)
EOSINOPHIL # BLD AUTO: 0.25 X10(3) UL (ref 0–0.7)
EOSINOPHIL NFR BLD AUTO: 2.9 %
ERYTHROCYTE [DISTWIDTH] IN BLOOD BY AUTOMATED COUNT: 14.3 % (ref 11–15)
EST. AVERAGE GLUCOSE BLD GHB EST-MCNC: 128 MG/DL (ref 68–126)
FASTING PATIENT LIPID ANSWER: NO
FASTING STATUS PATIENT QL REPORTED: NO
GFR SERPLBLD BASED ON 1.73 SQ M-ARVRAT: 96 ML/MIN/1.73M2 (ref 60–?)
GLOBULIN PLAS-MCNC: 3.7 G/DL (ref 2.8–4.4)
GLUCOSE BLD-MCNC: 74 MG/DL (ref 70–99)
HBA1C MFR BLD: 6.1 % (ref ?–5.7)
HCT VFR BLD AUTO: 43.5 %
HDLC SERPL-MCNC: 68 MG/DL (ref 40–59)
HGB BLD-MCNC: 13.6 G/DL
IMM GRANULOCYTES # BLD AUTO: 0.02 X10(3) UL (ref 0–1)
IMM GRANULOCYTES NFR BLD: 0.2 %
LDLC SERPL CALC-MCNC: 104 MG/DL (ref ?–100)
LYMPHOCYTES # BLD AUTO: 1.98 X10(3) UL (ref 1–4)
LYMPHOCYTES NFR BLD AUTO: 22.9 %
MCH RBC QN AUTO: 27 PG (ref 26–34)
MCHC RBC AUTO-ENTMCNC: 31.3 G/DL (ref 31–37)
MCV RBC AUTO: 86.5 FL
MICROALBUMIN UR-MCNC: 0.85 MG/DL
MICROALBUMIN/CREAT 24H UR-RTO: 20.7 UG/MG (ref ?–30)
MONOCYTES # BLD AUTO: 0.53 X10(3) UL (ref 0.1–1)
MONOCYTES NFR BLD AUTO: 6.1 %
NEUTROPHILS # BLD AUTO: 5.79 X10 (3) UL (ref 1.5–7.7)
NEUTROPHILS # BLD AUTO: 5.79 X10(3) UL (ref 1.5–7.7)
NEUTROPHILS NFR BLD AUTO: 67.1 %
NONHDLC SERPL-MCNC: 136 MG/DL (ref ?–130)
OSMOLALITY SERPL CALC.SUM OF ELEC: 289 MOSM/KG (ref 275–295)
PLATELET # BLD AUTO: 341 10(3)UL (ref 150–450)
POTASSIUM SERPL-SCNC: 3.9 MMOL/L (ref 3.5–5.1)
PROT SERPL-MCNC: 7.6 G/DL (ref 6.4–8.2)
RBC # BLD AUTO: 5.03 X10(6)UL
SODIUM SERPL-SCNC: 140 MMOL/L (ref 136–145)
TRIGL SERPL-MCNC: 187 MG/DL (ref 30–149)
VLDLC SERPL CALC-MCNC: 32 MG/DL (ref 0–30)
WBC # BLD AUTO: 8.6 X10(3) UL (ref 4–11)

## 2022-10-20 PROCEDURE — 90662 IIV NO PRSV INCREASED AG IM: CPT | Performed by: FAMILY MEDICINE

## 2022-10-20 PROCEDURE — 83036 HEMOGLOBIN GLYCOSYLATED A1C: CPT

## 2022-10-20 PROCEDURE — 3044F HG A1C LEVEL LT 7.0%: CPT | Performed by: FAMILY MEDICINE

## 2022-10-20 PROCEDURE — 3008F BODY MASS INDEX DOCD: CPT | Performed by: FAMILY MEDICINE

## 2022-10-20 PROCEDURE — 82043 UR ALBUMIN QUANTITATIVE: CPT

## 2022-10-20 PROCEDURE — 99397 PER PM REEVAL EST PAT 65+ YR: CPT | Performed by: FAMILY MEDICINE

## 2022-10-20 PROCEDURE — 90471 IMMUNIZATION ADMIN: CPT | Performed by: FAMILY MEDICINE

## 2022-10-20 PROCEDURE — 85025 COMPLETE CBC W/AUTO DIFF WBC: CPT

## 2022-10-20 PROCEDURE — 3075F SYST BP GE 130 - 139MM HG: CPT | Performed by: FAMILY MEDICINE

## 2022-10-20 PROCEDURE — 80053 COMPREHEN METABOLIC PANEL: CPT

## 2022-10-20 PROCEDURE — 82570 ASSAY OF URINE CREATININE: CPT

## 2022-10-20 PROCEDURE — 90472 IMMUNIZATION ADMIN EACH ADD: CPT | Performed by: FAMILY MEDICINE

## 2022-10-20 PROCEDURE — 99214 OFFICE O/P EST MOD 30 MIN: CPT | Performed by: FAMILY MEDICINE

## 2022-10-20 PROCEDURE — 36415 COLL VENOUS BLD VENIPUNCTURE: CPT

## 2022-10-20 PROCEDURE — 3079F DIAST BP 80-89 MM HG: CPT | Performed by: FAMILY MEDICINE

## 2022-10-20 PROCEDURE — 90715 TDAP VACCINE 7 YRS/> IM: CPT | Performed by: FAMILY MEDICINE

## 2022-10-20 PROCEDURE — 80061 LIPID PANEL: CPT

## 2022-10-21 ENCOUNTER — TELEPHONE (OUTPATIENT)
Dept: HEMATOLOGY/ONCOLOGY | Facility: HOSPITAL | Age: 72
End: 2022-10-21

## 2022-10-21 DIAGNOSIS — R59.0 CERVICAL LYMPHADENOPATHY: Primary | ICD-10-CM

## 2022-10-21 NOTE — TELEPHONE ENCOUNTER
Additional call placed to patient. Patient is s/p left breast US guided biopsy, performed 10/18/22 at [facility]. Patient has met with Dr. Ana Aponte and has discussed malignant pathology findings. Patient has been referred to Dr. Pearlean Hammans and Dr. Kaleb Ching for further management. Introduced myself to patient as Breast RN Navigator. Shared with patient my role to provide her support and education, assist with care coordination as well as connect her to supportive resources. Questions answered regarding pathology findings to the best of my ability. Appointments scheduled for patient with Dr. Pearlean Hammans and Dr. Kaleb Ching for Wednesday 10/26/22 at 12pm and 1pm.  All appointment information provided to patient. Patient inquires as to assistance with an US of her neck. Patient shares a history of thyroid carcinoma. She has shared with Dr. Ana Aponte a palpable cervical lymph node on her right neck that has been causing her concern. Patient was asking for assistance coordinating an [address] [facility]. Shared with patient I would contact Dr. Ana Aponte and inquire as orders. Provided patient with my contact information. Encouraged patient to call with questions, concerns, or needs.

## 2022-10-26 ENCOUNTER — OFFICE VISIT (OUTPATIENT)
Dept: HEMATOLOGY/ONCOLOGY | Facility: HOSPITAL | Age: 72
End: 2022-10-26
Attending: INTERNAL MEDICINE
Payer: COMMERCIAL

## 2022-10-26 ENCOUNTER — OFFICE VISIT (OUTPATIENT)
Dept: SURGERY | Facility: CLINIC | Age: 72
End: 2022-10-26
Payer: COMMERCIAL

## 2022-10-26 VITALS
RESPIRATION RATE: 16 BRPM | BODY MASS INDEX: 30.35 KG/M2 | SYSTOLIC BLOOD PRESSURE: 171 MMHG | WEIGHT: 177.81 LBS | DIASTOLIC BLOOD PRESSURE: 79 MMHG | HEART RATE: 83 BPM | HEIGHT: 64 IN | TEMPERATURE: 98 F | OXYGEN SATURATION: 99 %

## 2022-10-26 VITALS
HEIGHT: 64 IN | SYSTOLIC BLOOD PRESSURE: 178 MMHG | HEART RATE: 83 BPM | RESPIRATION RATE: 16 BRPM | BODY MASS INDEX: 30.22 KG/M2 | OXYGEN SATURATION: 99 % | DIASTOLIC BLOOD PRESSURE: 77 MMHG | TEMPERATURE: 98 F | WEIGHT: 177 LBS

## 2022-10-26 DIAGNOSIS — C50.912 INVASIVE DUCTAL CARCINOMA OF BREAST, LEFT (HCC): Primary | ICD-10-CM

## 2022-10-26 DIAGNOSIS — Z17.0 MALIGNANT NEOPLASM OF OVERLAPPING SITES OF LEFT BREAST IN FEMALE, ESTROGEN RECEPTOR POSITIVE (HCC): Primary | ICD-10-CM

## 2022-10-26 DIAGNOSIS — C50.812 MALIGNANT NEOPLASM OF OVERLAPPING SITES OF LEFT BREAST IN FEMALE, ESTROGEN RECEPTOR POSITIVE (HCC): Primary | ICD-10-CM

## 2022-10-26 PROCEDURE — 99205 OFFICE O/P NEW HI 60 MIN: CPT | Performed by: SURGERY

## 2022-10-26 PROCEDURE — 3008F BODY MASS INDEX DOCD: CPT | Performed by: SURGERY

## 2022-10-26 PROCEDURE — 99211 OFF/OP EST MAY X REQ PHY/QHP: CPT

## 2022-10-26 PROCEDURE — 3078F DIAST BP <80 MM HG: CPT | Performed by: SURGERY

## 2022-10-26 PROCEDURE — 3077F SYST BP >= 140 MM HG: CPT | Performed by: SURGERY

## 2022-10-26 NOTE — PATIENT INSTRUCTIONS
Dr. Quinton Pride  Tel: [phone]  Fax: [phone]  [address] [place], [address] [place] [address]  [phone]     Surgery/Procedure: Left breast wire localized lumpectomy, left lymphoscintigraphy, left sentinel lymph node biopsy     Anesthesia:   Gen  Surgery Length:   1.5 hours CPT:  06385, 12559, 32367   Wire LOC:   Yes Nuc Med:   Yes Ladonna Seed:  No       Dx & ICD-10: Invasive ductal carcinoma of breast, left ([facility] (C50.912). Radiology Instructions: Left breast, 3 o'clock position, 6 cm from the nipple, Q shaped clip, biopsy confirms invasive ductal carcinoma.   _______________________________________________________________________________    Someone must accompany you the day of the procedure to drive you home safely, because of anesthesia. You must remove any kind of makeup, acrylic nails, lotions, powders, creams or deodorant. [place] ONLY: Pre-admission will give instruct you on when to take Gatorade and Tylenol/acetaminophen prior to your surgery, purchase 2 - 12oz bottles of regular Gatorade (NOT RED/SUGAR FREE). Otherwise, you may not eat or drink anything else after 11PM the night before surgery. [place] ONLY: You may not eat or drink anything after midnight the day of your surgery. Wear comfortable clothing that can be easily removed. If you wear dentures, contacts lenses, or any prosthesis, you will be asked to remove them. Do not drink alcohol or smoke 24 hours prior to your procedure. Bring a picture ID and your insurance card. You will be contacted by the hospital for Pre-Admission Covid-19 testing (regardless of vaccination status) to be scheduled as an appointment prior to surgery. They will call closer to the surgery date to set this up, because the earliest this can be done is 72 hours prior to surgery.   The Pre-Admission Testing Department will call the day before to confirm your procedure, give you the time you need to arrive by and directions on where to go. They begin making calls after 2pm, if you are not contacted by 4pm, please call the surgeon's office listed above. Do not take any blood thinners at least one week prior to the procedure/surgery. This includes aspirin, baby aspirin, Ibuprofen products, herbal supplements, diet medications, vitamin E, fish oil and green tea supplements. Please check other supplements for these ingredients. *TYLENOL or acetaminophen is acceptable*  If you take Coumadin, Plavix, Xarelto, or Eliquis, please contact your prescribing physician for special instructions on how long to hold. If you take insulin contact your primary care physician for special instructions. Our surgery scheduler, Brad Gamez, will be contacting you to discuss surgery dates. If you have any questions related to scheduling your surgery, please reach out to her at [phone].  _____________________________________________________________________  PRE-OPERATIVE TESTING IF INDICATED BELOW  PLEASE COMPLETE ASAP (AT LEAST 10-14 DAYS PRIOR TO SURGERY)  [] CBC [x] BMP [] CMP [x] EKG    [] PT, PTT, INR [] Cardiac Clearance  [x] H&P Medical Clearance [] Chest X-ray     Please call Central Scheduling to schedule an appointment for pre-operative labs/tests @ [phone]    Does the patient have a pacemaker or ICD?      [] Yes   [x] No

## 2022-10-28 ENCOUNTER — TELEPHONE (OUTPATIENT)
Dept: SURGERY | Facility: CLINIC | Age: 72
End: 2022-10-28

## 2022-10-28 ENCOUNTER — TELEPHONE (OUTPATIENT)
Dept: GENERAL RADIOLOGY | Facility: HOSPITAL | Age: 72
End: 2022-10-28

## 2022-10-28 DIAGNOSIS — C50.912 INVASIVE DUCTAL CARCINOMA OF BREAST, LEFT (HCC): Primary | ICD-10-CM

## 2022-10-28 NOTE — TELEPHONE ENCOUNTER
Calling pt in regards to scheduling surgery. Informed pt that I have 11/15/2022 available at [facility] with Dr. Rehan Boone. Pt verbalized understanding and in agreement with date and location. All questions answered. Encouraged pt to call or [place] message office with any other questions or concerns.

## 2022-10-28 NOTE — TELEPHONE ENCOUNTER
1500: Spoke with Augusta Garrett at this time. Discussed sentinel lymph node mapping procedure to be done in the  nuclear medicine department and localization procedure to be done in the  women's imaging center prior to surgery on Tuesday, November 15. Both procedures and the flow of the day on surgery date explained. Augusta Garrett verbalized understanding and gratitude for the call.

## 2022-11-01 ENCOUNTER — OFFICE VISIT (OUTPATIENT)
Dept: FAMILY MEDICINE CLINIC | Facility: CLINIC | Age: 72
End: 2022-11-01
Payer: COMMERCIAL

## 2022-11-01 ENCOUNTER — EKG ENCOUNTER (OUTPATIENT)
Dept: LAB | Age: 72
End: 2022-11-01
Attending: FAMILY MEDICINE
Payer: COMMERCIAL

## 2022-11-01 VITALS
HEART RATE: 86 BPM | OXYGEN SATURATION: 99 % | BODY MASS INDEX: 30.73 KG/M2 | WEIGHT: 180 LBS | HEIGHT: 64 IN | DIASTOLIC BLOOD PRESSURE: 84 MMHG | SYSTOLIC BLOOD PRESSURE: 128 MMHG

## 2022-11-01 DIAGNOSIS — I10 ESSENTIAL HYPERTENSION: ICD-10-CM

## 2022-11-01 DIAGNOSIS — C50.812 MALIGNANT NEOPLASM OF OVERLAPPING SITES OF LEFT BREAST IN FEMALE, ESTROGEN RECEPTOR POSITIVE (HCC): ICD-10-CM

## 2022-11-01 DIAGNOSIS — E78.2 MIXED HYPERLIPIDEMIA: ICD-10-CM

## 2022-11-01 DIAGNOSIS — Z01.818 PREOP EXAMINATION: ICD-10-CM

## 2022-11-01 DIAGNOSIS — I44.7 LBBB (LEFT BUNDLE BRANCH BLOCK): ICD-10-CM

## 2022-11-01 DIAGNOSIS — E03.9 HYPOTHYROIDISM, UNSPECIFIED TYPE: ICD-10-CM

## 2022-11-01 DIAGNOSIS — Z01.818 PREOP EXAMINATION: Primary | ICD-10-CM

## 2022-11-01 DIAGNOSIS — Z17.0 MALIGNANT NEOPLASM OF OVERLAPPING SITES OF LEFT BREAST IN FEMALE, ESTROGEN RECEPTOR POSITIVE (HCC): ICD-10-CM

## 2022-11-01 DIAGNOSIS — Z23 NEED FOR VACCINATION: ICD-10-CM

## 2022-11-01 PROCEDURE — 90471 IMMUNIZATION ADMIN: CPT | Performed by: FAMILY MEDICINE

## 2022-11-01 PROCEDURE — 3074F SYST BP LT 130 MM HG: CPT | Performed by: FAMILY MEDICINE

## 2022-11-01 PROCEDURE — 90677 PCV20 VACCINE IM: CPT | Performed by: FAMILY MEDICINE

## 2022-11-01 PROCEDURE — 3008F BODY MASS INDEX DOCD: CPT | Performed by: FAMILY MEDICINE

## 2022-11-01 PROCEDURE — 93010 ELECTROCARDIOGRAM REPORT: CPT | Performed by: FAMILY MEDICINE

## 2022-11-01 PROCEDURE — 93005 ELECTROCARDIOGRAM TRACING: CPT

## 2022-11-01 PROCEDURE — 99214 OFFICE O/P EST MOD 30 MIN: CPT | Performed by: FAMILY MEDICINE

## 2022-11-01 PROCEDURE — 3079F DIAST BP 80-89 MM HG: CPT | Performed by: FAMILY MEDICINE

## 2022-11-02 ENCOUNTER — PATIENT OUTREACH (OUTPATIENT)
Dept: HEMATOLOGY/ONCOLOGY | Facility: HOSPITAL | Age: 72
End: 2022-11-02

## 2022-11-02 DIAGNOSIS — C50.812 MALIGNANT NEOPLASM OF OVERLAPPING SITES OF LEFT BREAST IN FEMALE, ESTROGEN RECEPTOR POSITIVE (HCC): Primary | ICD-10-CM

## 2022-11-02 DIAGNOSIS — Z17.0 MALIGNANT NEOPLASM OF OVERLAPPING SITES OF LEFT BREAST IN FEMALE, ESTROGEN RECEPTOR POSITIVE (HCC): Primary | ICD-10-CM

## 2022-11-03 ENCOUNTER — MED REC SCAN ONLY (OUTPATIENT)
Dept: FAMILY MEDICINE CLINIC | Facility: CLINIC | Age: 72
End: 2022-11-03

## 2022-11-11 ENCOUNTER — ANESTHESIA EVENT (OUTPATIENT)
Dept: SURGERY | Facility: HOSPITAL | Age: 72
End: 2022-11-11
Payer: COMMERCIAL

## 2022-11-14 ENCOUNTER — TELEPHONE (OUTPATIENT)
Dept: HEMATOLOGY/ONCOLOGY | Facility: HOSPITAL | Age: 72
End: 2022-11-14

## 2022-11-14 ENCOUNTER — OFFICE VISIT (OUTPATIENT)
Dept: PHYSICAL THERAPY | Facility: HOSPITAL | Age: 72
End: 2022-11-14
Attending: SURGERY
Payer: COMMERCIAL

## 2022-11-14 NOTE — TELEPHONE ENCOUNTER
Call received from patient, sharing feedback concerning scheduled surgery with Dr. Rubi Damon on 11/15/22. Patient shares she was \"feeling very confident\" with the Team at [address] [place] [address]. However after speaking with PEDMUND T. staff member at [facility] she was feeling fearful for her surgical day. Emotional support provided to patient. Asked for patient to share her concerns. Patient wanting to know why the preop protocol is different for [facility] than it is at [facility].  Shared with patient there are different Anesthesiology Physician groups at each institution with their own protocols. Patient acknowledged. Patient wanting to know why she will not need to have a preoperative COVID test.  Shared with patient our current protocol is for patients with a positive COVID test within 90 days of surgery we do not repeat this testing. Patient reports positive COVID on 9/23/22. Patient acknowledged. Patient questioning why she was instructed to hold her Enalapril prior to surgery but not her Metoprolol. Shared with patient this is part of the preoperative anesthesia protocol. Patient shares she is feeling uneasy as to anesthesia with her prior experiences. Emotional support provided to patient. Acknowledged patients feedback and feelings concerning her scheduled surgery. Shared with patient my plan to contact the Charge RN for [facility] to discuss her concerns as well as provide more specific information concerning her cardiac medications and anesthesia concerns. Asked patient if she had any additional questions or concerns as they relate to the wire localization or lymphoscintigraphy. Patient acknowledged speaking with a Radiology RN on 10/28/22 about these procedures. Offered to reinforce to the patient what to expect. Patient declines.   Patient is scheduled for pre op Physical Therapy assessment today at 3pm.  Reinforced to patient location of her appointment, including building address, parking color, checking in at registration kiosk for [facility]. Patient acknowledged. Asked patient to please call my office should she not receive communication from P.A[facility]. to address her above concerns.

## 2022-11-14 NOTE — PROGRESS NOTES
BREAST CANCER SURGICAL SCREENINGS  [facility]      PATIENT SUMMARY:     Involved Side:   LEFT                                                   Dominant Hand:     RIGHT                             Occupation:     Full-time at [employer] (  and can delegate heavy work if needed)                Prior Level of Function:             No issues, but had R RTC surgery after a fall                             Social Activities:                                                            Pertinent PMH:     RTC R shoulder, Thyroid removed 2016 or 2017                                                        PCP, Surgeon, and Oncologist:        Amirah Martin                                                     Surgery Type and Date:    11/15/2022 L breast lumpectomy                                                    # Nodes + / # Nodes Removed:      ? Chemotherapy and/or Radiation:  ? OBJECTIVE MEASUREMENTS:   11/14/2022:  -Reviewed exercises to begin 3 days after surgery. Movement with no pain.                   Pre-surgical screening date: 11/14/2022    Post-surgical screening date:     Post-surgical screening date:      Pain scale:  0/10    Pain scale:      Pain scale:      Screening Therapist: LUCY    Screening Therapist:     Screening Therapist:                                                                                        Right Left    Right Left    Right Left   Shoulder  A/AAROM A/AAROM  Shoulder  A/AAROM A/AAROM  Shoulder  A/AAROM A/AAROM   Supine flex [facility] WFL  Supine flex    Supine flex      abd **Slight tightness in R**    abd     abd      ER     ER     ER      IR     IR     IR     Sitting flex [facility] WFL  Sitting flex    Sitting flex      abd     abd     abd      ext     ext     ext     Functional IR     Functional IR     Functional IR                      Shoulder strength  Right Left  Shoulder strength  Right Left  Shoulder strength  Right Left    flex 5 5   flex     flex      abd 5 5   abd     abd      ext 5 5   ext     ext      ER 5 5   ER     ER      IR 5 5   IR     IR                     Posture: Good    Posture:     Posture:                      Cording (grade 0-3):     Cording (grade 0-3):     Cording (grade 0-3):      R: 0    R:     R:      L: 0    L:     L:      Miscellaneous:      Miscellaneous:      Miscellaneous:                                                                       Any feelings of heaviness or tightness, swelling, redness, and/or heat in the at-risk arm, breast, chest, or truncal area? NONE    Any feelings of heaviness or tightness, swelling, redness, and/or heat in the at-risk arm, breast, chest, or truncal area? Any feelings of heaviness or tightness, swelling, redness, and/or heat in the at-risk arm, breast, chest, or truncal area?                                       Arm Volume     Arm Volume     Arm Volume        Lymphedema Calculations 11/14/2022   DATE MEASURED 11/14/2022   LOCATION/MEASUREMENTS LUE   PATIENT POSITION  supine 2 pillows   LIMB POSITION 75 deg abduction   STARTING POINT 17/18cm from 3rd cuticle   RIGHT HAND VOLUME 19.8cm at met heads   LEFT HAND VOLUME 19.4cm at met heads   MEASUREMENT A 16.5   MEASUREMENT B 17.7   MEASUREMENT C 21.6   MEASUREMENT D 24.5   MEASUREMENT E 25.7   MEASUREMENT F 27.3   MEASUREMENT G 29   MEASUREMENT H 31.8   MEASUREMENT I 35.5    TOTAL VOLUME  [id number]   DATE MEASURED 11/14/2022   LOCATION/MEASUREMENTS RUE   MEASUREMENT A 16.3   MEASUREMENT B 18.5   MEASUREMENT C 22   MEASUREMENT D 24.5   MEASUREMENT E 26   MEASUREMENT F 27.8   MEASUREMENT G 29.1   MEASUREMENT H 31.8   MEASUREMENT I 35.7    TOTAL VOLUME  [id number]   % DIFFERENCE -[phone]

## 2022-11-15 ENCOUNTER — ANESTHESIA (OUTPATIENT)
Dept: SURGERY | Facility: HOSPITAL | Age: 72
End: 2022-11-15
Payer: COMMERCIAL

## 2022-11-15 ENCOUNTER — HOSPITAL ENCOUNTER (OUTPATIENT)
Dept: MAMMOGRAPHY | Facility: HOSPITAL | Age: 72
Discharge: HOME | End: 2022-11-15
Attending: SURGERY | Admitting: SURGERY
Payer: COMMERCIAL

## 2022-11-15 ENCOUNTER — HOSPITAL ENCOUNTER (OUTPATIENT)
Dept: NUCLEAR MEDICINE | Facility: HOSPITAL | Age: 72
Setting detail: HOSPITAL OUTPATIENT SURGERY
Discharge: HOME | End: 2022-11-15
Attending: SURGERY | Admitting: SURGERY
Payer: COMMERCIAL

## 2022-11-15 ENCOUNTER — HOSPITAL ENCOUNTER (OUTPATIENT)
Facility: HOSPITAL | Age: 72
Setting detail: HOSPITAL OUTPATIENT SURGERY
Discharge: HOME | End: 2022-11-15
Attending: SURGERY | Admitting: SURGERY
Payer: COMMERCIAL

## 2022-11-15 VITALS
DIASTOLIC BLOOD PRESSURE: 58 MMHG | OXYGEN SATURATION: 99 % | WEIGHT: 175 LBS | BODY MASS INDEX: 29.88 KG/M2 | HEIGHT: 64 IN | SYSTOLIC BLOOD PRESSURE: 134 MMHG | TEMPERATURE: 98 F | HEART RATE: 81 BPM | RESPIRATION RATE: 18 BRPM

## 2022-11-15 DIAGNOSIS — C50.912 INVASIVE DUCTAL CARCINOMA OF BREAST, LEFT (HCC): ICD-10-CM

## 2022-11-15 DIAGNOSIS — E11.9 TYPE 2 DIABETES MELLITUS WITHOUT COMPLICATION, WITHOUT LONG-TERM CURRENT USE OF INSULIN (HCC): Primary | ICD-10-CM

## 2022-11-15 LAB
GLUCOSE BLD-MCNC: 110 MG/DL (ref 70–99)
GLUCOSE BLD-MCNC: 142 MG/DL (ref 70–99)

## 2022-11-15 PROCEDURE — 88307 TISSUE EXAM BY PATHOLOGIST: CPT | Performed by: SURGERY

## 2022-11-15 PROCEDURE — 19281 PERQ DEVICE BREAST 1ST IMAG: CPT | Performed by: SURGERY

## 2022-11-15 PROCEDURE — 76098 X-RAY EXAM SURGICAL SPECIMEN: CPT | Performed by: SURGERY

## 2022-11-15 PROCEDURE — 0HBU0ZZ EXCISION OF LEFT BREAST, OPEN APPROACH: ICD-10-PCS | Performed by: SURGERY

## 2022-11-15 PROCEDURE — 88305 TISSUE EXAM BY PATHOLOGIST: CPT | Performed by: SURGERY

## 2022-11-15 PROCEDURE — 07B60ZX EXCISION OF LEFT AXILLARY LYMPHATIC, OPEN APPROACH, DIAGNOSTIC: ICD-10-PCS | Performed by: SURGERY

## 2022-11-15 PROCEDURE — 0HQU0ZZ REPAIR LEFT BREAST, OPEN APPROACH: ICD-10-PCS | Performed by: SURGERY

## 2022-11-15 PROCEDURE — 82962 GLUCOSE BLOOD TEST: CPT

## 2022-11-15 PROCEDURE — 78195 LYMPH SYSTEM IMAGING: CPT | Performed by: SURGERY

## 2022-11-15 RX ORDER — ONDANSETRON 2 MG/ML
INJECTION INTRAMUSCULAR; INTRAVENOUS
Status: COMPLETED
Start: 2022-11-15 | End: 2022-11-15

## 2022-11-15 RX ORDER — PROCHLORPERAZINE EDISYLATE 5 MG/ML
5 INJECTION INTRAMUSCULAR; INTRAVENOUS ONCE
Status: COMPLETED | OUTPATIENT
Start: 2022-11-15 | End: 2022-11-15

## 2022-11-15 RX ORDER — HYDROMORPHONE HYDROCHLORIDE 1 MG/ML
0.4 INJECTION, SOLUTION INTRAMUSCULAR; INTRAVENOUS; SUBCUTANEOUS EVERY 5 MIN PRN
Status: DISCONTINUED | OUTPATIENT
Start: 2022-11-15 | End: 2022-11-15

## 2022-11-15 RX ORDER — METOCLOPRAMIDE HYDROCHLORIDE 5 MG/ML
INJECTION INTRAMUSCULAR; INTRAVENOUS
Status: COMPLETED
Start: 2022-11-15 | End: 2022-11-15

## 2022-11-15 RX ORDER — BUPIVACAINE HYDROCHLORIDE 5 MG/ML
INJECTION, SOLUTION EPIDURAL; INTRACAUDAL AS NEEDED
Status: DISCONTINUED | OUTPATIENT
Start: 2022-11-15 | End: 2022-11-15 | Stop reason: HOSPADM

## 2022-11-15 RX ORDER — LIDOCAINE HYDROCHLORIDE AND EPINEPHRINE 10; 10 MG/ML; UG/ML
INJECTION, SOLUTION INFILTRATION; PERINEURAL AS NEEDED
Status: DISCONTINUED | OUTPATIENT
Start: 2022-11-15 | End: 2022-11-15 | Stop reason: HOSPADM

## 2022-11-15 RX ORDER — SODIUM CHLORIDE, SODIUM LACTATE, POTASSIUM CHLORIDE, CALCIUM CHLORIDE 600; 310; 30; 20 MG/100ML; MG/100ML; MG/100ML; MG/100ML
INJECTION, SOLUTION INTRAVENOUS CONTINUOUS
Status: DISCONTINUED | OUTPATIENT
Start: 2022-11-15 | End: 2022-11-15

## 2022-11-15 RX ORDER — SODIUM CHLORIDE 9 MG/ML
INJECTION INTRAVENOUS AS NEEDED
Status: DISCONTINUED | OUTPATIENT
Start: 2022-11-15 | End: 2022-11-15 | Stop reason: HOSPADM

## 2022-11-15 RX ORDER — ACETAMINOPHEN 500 MG
1000 TABLET ORAL ONCE AS NEEDED
Status: DISCONTINUED | OUTPATIENT
Start: 2022-11-15 | End: 2022-11-15

## 2022-11-15 RX ORDER — HYDROMORPHONE HYDROCHLORIDE 1 MG/ML
0.2 INJECTION, SOLUTION INTRAMUSCULAR; INTRAVENOUS; SUBCUTANEOUS EVERY 5 MIN PRN
Status: DISCONTINUED | OUTPATIENT
Start: 2022-11-15 | End: 2022-11-15

## 2022-11-15 RX ORDER — NALOXONE HYDROCHLORIDE 0.4 MG/ML
80 INJECTION, SOLUTION INTRAMUSCULAR; INTRAVENOUS; SUBCUTANEOUS AS NEEDED
Status: DISCONTINUED | OUTPATIENT
Start: 2022-11-15 | End: 2022-11-15

## 2022-11-15 RX ORDER — ACETAMINOPHEN AND CODEINE PHOSPHATE 300; 30 MG/1; MG/1
2 TABLET ORAL ONCE AS NEEDED
Status: DISCONTINUED | OUTPATIENT
Start: 2022-11-15 | End: 2022-11-15

## 2022-11-15 RX ORDER — CEFAZOLIN SODIUM/WATER 2 G/20 ML
SYRINGE (ML) INTRAVENOUS
Status: DISCONTINUED
Start: 2022-11-15 | End: 2022-11-15

## 2022-11-15 RX ORDER — NICOTINE POLACRILEX 4 MG
30 LOZENGE BUCCAL
Status: DISCONTINUED | OUTPATIENT
Start: 2022-11-15 | End: 2022-11-15

## 2022-11-15 RX ORDER — LIDOCAINE AND PRILOCAINE 25; 25 MG/G; MG/G
CREAM TOPICAL ONCE
Status: COMPLETED | OUTPATIENT
Start: 2022-11-15 | End: 2022-11-15

## 2022-11-15 RX ORDER — ACETAMINOPHEN 500 MG
1000 TABLET ORAL ONCE
Status: DISCONTINUED | OUTPATIENT
Start: 2022-11-15 | End: 2022-11-15 | Stop reason: HOSPADM

## 2022-11-15 RX ORDER — HYDROCODONE BITARTRATE AND ACETAMINOPHEN 5; 325 MG/1; MG/1
1-2 TABLET ORAL EVERY 6 HOURS PRN
Qty: 20 TABLET | Refills: 0 | Status: SHIPPED | OUTPATIENT
Start: 2022-11-15 | End: 2022-11-21 | Stop reason: ALTCHOICE

## 2022-11-15 RX ORDER — NICOTINE POLACRILEX 4 MG
15 LOZENGE BUCCAL
Status: DISCONTINUED | OUTPATIENT
Start: 2022-11-15 | End: 2022-11-15

## 2022-11-15 RX ORDER — PHENYLEPHRINE HCL 10 MG/ML
VIAL (ML) INJECTION AS NEEDED
Status: DISCONTINUED | OUTPATIENT
Start: 2022-11-15 | End: 2022-11-15 | Stop reason: SURG

## 2022-11-15 RX ORDER — ONDANSETRON 2 MG/ML
INJECTION INTRAMUSCULAR; INTRAVENOUS AS NEEDED
Status: DISCONTINUED | OUTPATIENT
Start: 2022-11-15 | End: 2022-11-15 | Stop reason: SURG

## 2022-11-15 RX ORDER — ONDANSETRON 2 MG/ML
4 INJECTION INTRAMUSCULAR; INTRAVENOUS EVERY 6 HOURS PRN
Status: DISCONTINUED | OUTPATIENT
Start: 2022-11-15 | End: 2022-11-15

## 2022-11-15 RX ORDER — HYDROMORPHONE HYDROCHLORIDE 1 MG/ML
0.6 INJECTION, SOLUTION INTRAMUSCULAR; INTRAVENOUS; SUBCUTANEOUS EVERY 5 MIN PRN
Status: DISCONTINUED | OUTPATIENT
Start: 2022-11-15 | End: 2022-11-15

## 2022-11-15 RX ORDER — PROCHLORPERAZINE EDISYLATE 5 MG/ML
INJECTION INTRAMUSCULAR; INTRAVENOUS
Status: DISCONTINUED
Start: 2022-11-15 | End: 2022-11-15

## 2022-11-15 RX ORDER — ONDANSETRON 8 MG/1
8 TABLET, ORALLY DISINTEGRATING ORAL EVERY 8 HOURS PRN
Qty: 10 TABLET | Refills: 0 | Status: SHIPPED | OUTPATIENT
Start: 2022-11-15

## 2022-11-15 RX ORDER — LIDOCAINE HYDROCHLORIDE 10 MG/ML
INJECTION, SOLUTION EPIDURAL; INFILTRATION; INTRACAUDAL; PERINEURAL AS NEEDED
Status: DISCONTINUED | OUTPATIENT
Start: 2022-11-15 | End: 2022-11-15 | Stop reason: SURG

## 2022-11-15 RX ORDER — KETOROLAC TROMETHAMINE 30 MG/ML
INJECTION, SOLUTION INTRAMUSCULAR; INTRAVENOUS AS NEEDED
Status: DISCONTINUED | OUTPATIENT
Start: 2022-11-15 | End: 2022-11-15 | Stop reason: SURG

## 2022-11-15 RX ORDER — METOCLOPRAMIDE HYDROCHLORIDE 5 MG/ML
10 INJECTION INTRAMUSCULAR; INTRAVENOUS EVERY 8 HOURS PRN
Status: DISCONTINUED | OUTPATIENT
Start: 2022-11-15 | End: 2022-11-15

## 2022-11-15 RX ORDER — ACETAMINOPHEN AND CODEINE PHOSPHATE 300; 30 MG/1; MG/1
1 TABLET ORAL ONCE AS NEEDED
Status: DISCONTINUED | OUTPATIENT
Start: 2022-11-15 | End: 2022-11-15

## 2022-11-15 RX ORDER — DEXTROSE MONOHYDRATE 25 G/50ML
50 INJECTION, SOLUTION INTRAVENOUS
Status: DISCONTINUED | OUTPATIENT
Start: 2022-11-15 | End: 2022-11-15

## 2022-11-15 RX ORDER — DIAZEPAM 5 MG/1
5 TABLET ORAL AS NEEDED
Status: DISCONTINUED | OUTPATIENT
Start: 2022-11-15 | End: 2022-11-15 | Stop reason: HOSPADM

## 2022-11-15 RX ORDER — ETOMIDATE 2 MG/ML
INJECTION INTRAVENOUS AS NEEDED
Status: DISCONTINUED | OUTPATIENT
Start: 2022-11-15 | End: 2022-11-15 | Stop reason: SURG

## 2022-11-15 RX ORDER — CEFAZOLIN SODIUM/WATER 2 G/20 ML
2 SYRINGE (ML) INTRAVENOUS ONCE
Status: COMPLETED | OUTPATIENT
Start: 2022-11-15 | End: 2022-11-15

## 2022-11-15 RX ORDER — DEXAMETHASONE SODIUM PHOSPHATE 4 MG/ML
VIAL (ML) INJECTION AS NEEDED
Status: DISCONTINUED | OUTPATIENT
Start: 2022-11-15 | End: 2022-11-15 | Stop reason: SURG

## 2022-11-15 RX ADMIN — PHENYLEPHRINE HCL 100 MCG: 10 MG/ML VIAL (ML) INJECTION at 16:27:00

## 2022-11-15 RX ADMIN — KETOROLAC TROMETHAMINE 30 MG: 30 INJECTION, SOLUTION INTRAMUSCULAR; INTRAVENOUS at 16:31:00

## 2022-11-15 RX ADMIN — CEFAZOLIN SODIUM/WATER 2 G: 2 G/20 ML SYRINGE (ML) INTRAVENOUS at 15:58:00

## 2022-11-15 RX ADMIN — ONDANSETRON 4 MG: 2 INJECTION INTRAMUSCULAR; INTRAVENOUS at 16:31:00

## 2022-11-15 RX ADMIN — LIDOCAINE HYDROCHLORIDE 50 MG: 10 INJECTION, SOLUTION EPIDURAL; INFILTRATION; INTRACAUDAL; PERINEURAL at 15:45:00

## 2022-11-15 RX ADMIN — ETOMIDATE 4 MG: 2 INJECTION INTRAVENOUS at 15:49:00

## 2022-11-15 RX ADMIN — PHENYLEPHRINE HCL 100 MCG: 10 MG/ML VIAL (ML) INJECTION at 16:13:00

## 2022-11-15 RX ADMIN — DEXAMETHASONE SODIUM PHOSPHATE 4 MG: 4 MG/ML VIAL (ML) INJECTION at 15:58:00

## 2022-11-15 RX ADMIN — SODIUM CHLORIDE, SODIUM LACTATE, POTASSIUM CHLORIDE, CALCIUM CHLORIDE: 600; 310; 30; 20 INJECTION, SOLUTION INTRAVENOUS at 16:21:00

## 2022-11-15 RX ADMIN — PHENYLEPHRINE HCL 100 MCG: 10 MG/ML VIAL (ML) INJECTION at 16:21:00

## 2022-11-15 RX ADMIN — PHENYLEPHRINE HCL 100 MCG: 10 MG/ML VIAL (ML) INJECTION at 16:39:00

## 2022-11-15 RX ADMIN — ETOMIDATE 12 MG: 2 INJECTION INTRAVENOUS at 15:46:00

## 2022-11-15 NOTE — BRIEF OP NOTE
Pre-Operative Diagnosis: Invasive ductal carcinoma of breast, left ([address] [C50.912]     Post-Operative Diagnosis: Invasive ductal carcinoma of breast, left ([address] [C50.912]      Procedure Performed:   Left breast wire localized lumpectomy, left lymphoscintigraphy, left sentinel lymph node biopsy    Surgeon(s) and Role:     Esdras Carreno MD - Primary    Assistant(s):  Surgical Assistant.: Maria Fernanda Jo CSA     Surgical Findings: Clip in xray     Specimen: , SLN x1     Estimated Blood Loss: Blood Output: 10 mL (11/15/2022  4:30 PM)    Lala Landers MD  11/15/2022  5:42 PM

## 2022-11-15 NOTE — ANESTHESIA PROCEDURE NOTES
Airway  Date/Time: 11/15/2022 3:52 PM  Urgency: elective      General Information and Staff    Patient location during procedure: OR  Anesthesiologist: Rojas Campa MD  Performed: anesthesiologist     Indications and Patient Condition  Indications for airway management: anesthesia  Spontaneous ventilation: present  Sedation level: deep  Preoxygenated: yes  Patient position: sniffing  Mask difficulty assessment: 1 - vent by mask    Final Airway Details  Final airway type: supraglottic airway      Successful airway: classic  Size 4       Number of attempts at approach: 1  Ventilation between attempts: BVM  Number of other approaches attempted: 0

## 2022-11-15 NOTE — IMAGING NOTE
Assisted  with mammography guided needle localization of the left breast.   Shannon Gregory identified with spelling of name and date of birth. Medications and allergies reviewed. The following allergies were reported: Antihistamine & Nasal Deconges [Fexofenadine-pseudoephedrine]OTHER (SEE COMMENTS)   Sudafed [Pseudoephedrine]OTHER (SEE COMMENTS)   SeasonalCoughing      History:  Left breast-Invasive ductal carcinoma of breast  Surgery: Left breast wire localized lumpectomy, left lymphoscintigraphy, left sentinel lymph node biopsy    Order verified. Procedure explained and questions answered. Shannon Gregory verbalized understanding and agreement. 1302: Written consent obtained by imaging staff.     [phone]: Scans taken by Lord Camejo- mammography technologist    1325: Dr. Mary Lema present    [phone]: Time out complete. Site prepped in a sterile manner by imaging technologist.   9001: Lidocaine administered for anesthetic affect. 1327: Martin 20G x 10cm needle placed-left breast, 3 o'clock position, 6 cm from the nipple, Q shaped clip, biopsy confirms invasive ductal carcinoma    Shannon Gregory tolerated procedure well. Site cleaned. Wire secured with blue clip, steri strips, sterile 4x4 gauze dressing, and Tegaderm. Shannon Gregory transported via wheelchair to pre-op/surgery holding in stable condition. Ms. Michelle Flood without complaints or concerns at this time.

## 2022-11-16 RX ORDER — METOPROLOL SUCCINATE 25 MG/1
25 TABLET, EXTENDED RELEASE ORAL DAILY
Qty: 90 TABLET | Refills: 1 | Status: SHIPPED | OUTPATIENT
Start: 2022-11-16

## 2022-11-16 RX ORDER — ENALAPRIL MALEATE 5 MG/1
5 TABLET ORAL DAILY
Qty: 90 TABLET | Refills: 1 | Status: SHIPPED | OUTPATIENT
Start: 2022-11-16

## 2022-11-16 NOTE — TELEPHONE ENCOUNTER
Refill passed per [facility] protocol.    Requested Prescriptions   Pending Prescriptions Disp Refills    METFORMIN HCL 1000 MG Oral Tab [Pharmacy Med Name: METFORMIN HCL 1,000 MG TABLET] 180 tablet 0     Sig: TAKE 1 TABLET BY MOUTH TWICE A DAY WITH MEALS       Diabetes Medication Protocol Passed - 11/16/2022  2:02 PM        Passed - Last A1C < 7.5 and within past 6 months     Lab Results   Component Value Date    A1C 6.1 (H) 10/20/2022             Passed - In person appointment or virtual visit in the past 6 mos or appointment in next 3 mos     Recent Outpatient Visits              2 days ago     [address] [place] [address]    Office Visit    2 weeks ago Preop examination    [address] [facility], [facility] Critical access hospital Visit    3 weeks ago Invasive ductal carcinoma of breast, left [facility])    [facility] Ryan Gilman MD    Office Visit    3 weeks ago Malignant neoplasm of overlapping sites of left breast in female, estrogen receptor positive [facility])    [facility] AND [facility] Hematology Oncology Rosette Sever, MD    Office Visit    3 weeks ago Encounter for routine adult health examination without abnormal findings    [address] [facility] [address] [place], [facility] Critical access hospital Visit          Future Appointments         Provider Department Appt Notes    In 5 days Rosette Sever, [address] Hematology Oncology post lumpectomy f/u    In 5 days Bryce Sparrow[address] Surgical Oncology Group aware date time location with Carlotta Clarkks   1st post op    In 2 weeks Ryan Gilman MD [facility] Surgical Oncology Group 2nd post op    In 3 weeks [place], [place] [facility] screen #2               Passed - [facility] or [facility] > 50     GFR Evaluation  EGFRCR: 96 , resulted on 10/20/2022          Passed - GFR in the past 12 months          METOPROLOL SUCCINATE ER 25 MG Oral Tablet 24 Hr [Pharmacy Med Name: METOPROLOL SUCC ER 25 MG TAB] 90 tablet 0     Sig: TAKE 1 TABLET BY MOUTH EVERY DAY       Hypertensive Medications Protocol Passed - 11/16/2022  2:02 PM        Passed - In person appointment in the past 12 or next 3 months     Recent Outpatient Visits              2 days ago     [address] [place] [address]    Office Visit    2 weeks ago Preop examination    [address] [facility], [address] [place], [place]    Office Visit    3 weeks ago Invasive ductal carcinoma of breast, left [facility])    [address] [place] [address] Rivka Cruz MD    Office Visit    3 weeks ago Malignant neoplasm of overlapping sites of left breast in female, estrogen receptor positive [facility])    [facility] Hematology Oncology Jyotsna Jama MD    Office Visit    3 weeks ago Encounter for routine adult health examination without abnormal findings    [address] [facility], [place] [facility] Visit          Future Appointments         Provider Department Appt Notes    In 5 days Aniya Young 19 Hematology Oncology post lumpectomy f/u    In 5 days Susan Archer, [facility] Surgical Oncology Group aware date time location with Ish Kramer   1st post op    In 2 weeks MD Corie [place] Surgical Oncology Group 2nd post op    In 3 weeks [facility]               Passed - Last BP reading less than 140/90     BP Readings from Last 1 Encounters:  11/15/22 : 134/58              Passed - CMP or BMP in past 6 months     Recent Results (from the past 4392 hour(s))   COMP METABOLIC PANEL (14)    Collection Time: 10/20/22  2:03 PM   Result Value Ref Range    Glucose 74 70 - 99 mg/dL    Sodium 140 136 - 145 mmol/L    Potassium 3.9 3.5 - 5.1 mmol/L    Chloride 106 98 - 112 mmol/L    CO2 28.0 21.0 - 32.0 mmol/L    Anion Gap 6 0 - 18 mmol/L    BUN 15 7 - 18 mg/dL    Creatinine 0.60 0.55 - 1.02 mg/dL    BUN/CREA Ratio 25.0 (H) 10.0 - 20.0    Calcium, Total 10.4 (H) 8.5 - 10.1 mg/dL    Calculated Osmolality 289 275 - 295 mOsm/kg    eGFR-Cr 96 >=60 mL/min/1.73m2    ALT 29 13 - 56 U/L    AST 16 15 - 37 U/L    Alkaline Phosphatase 87 55 - 142 U/L    Bilirubin, Total 0.8 0.1 - 2.0 mg/dL    Total Protein 7.6 6.4 - 8.2 g/dL    Albumin 3.9 3.4 - 5.0 g/dL    Globulin  3.7 2.8 - 4.4 g/dL    A/G Ratio 1.1 1.0 - 2.0    Patient Fasting for CMP? No      *Note: Due to a large number of results and/or encounters for the requested time period, some results have not been displayed. A complete set of results can be found in Results Review.                Passed - In person appointment or virtual visit in the past 6 months     Recent Outpatient Visits              2 days ago     [address] [place] [address]    Office Visit    2 weeks ago Preop examination    [address] [facility], [address] [place] [address], [facility] Visit    3 weeks ago Invasive ductal carcinoma of breast, left [facility])    [facility] Glenn Murphy MD    Office Visit    3 weeks ago Malignant neoplasm of overlapping sites of left breast in female, estrogen receptor positive [facility])    [facility] AND [facility] Hematology Oncology Dana He MD    Office Visit    3 weeks ago Encounter for routine adult health examination without abnormal findings    [address] [facility], [address] [place] [address], 101[address] [facility] Visit          Future Appointments         Provider Department Appt Notes    In 5 days Aniya Llamas 19 Hematology Oncology post lumpectomy f/u    In 5 days Regina Yip [facility] Surgical Oncology Group aware date time location with Sara Concepcion   1st post op    In 2 weeks Glenn Murphy MD [facility] Surgical Oncology Group 2nd post op    In 3 weeks [place], [place] [address] screen #2               [facility] or [facility] > 50     GFR Evaluation  EGFRCR: 96 , resulted on 10/20/2022            ENALAPRIL 5 MG Oral Tab [Pharmacy Med Name: ENALAPRIL MALEATE 5 MG TABLET] 90 tablet 0     Sig: TAKE 1 TABLET BY MOUTH EVERY DAY       Hypertensive Medications Protocol Passed - 11/16/2022  2:02 PM        Passed - In person appointment in the past 12 or next 3 months     Recent Outpatient Visits              2 days ago     [address] [place], [place]    Office Visit    2 weeks ago Preop examination    57[address] [facility], [address] [place], [place]    Office Visit    3 weeks ago Invasive ductal carcinoma of breast, left [facility])    [facility] E [facility] Hali Steele MD    Office Visit    3 weeks ago Malignant neoplasm of overlapping sites of left breast in female, estrogen receptor positive [facility])    [facility] AND [facility] Hematology Oncology Darren Bowman MD    Office Visit    3 weeks ago Encounter for routine adult health examination without abnormal findings    [address] [facility], [place], [place]    Office Visit          Future Appointments         Provider Department Appt Notes    In 5 days Aniya Boyd 19 Hematology Oncology post lumpectomy f/u    In 5 days Chalo Saleem [address] [place] [address] Surgical Oncology Group aware date time location with Cheli Simms   1st post op    In 2 weeks Hali Steele MD [facility] Surgical Oncology Group 2nd post op    In 3 weeks Dayday Marie PTA [facility] Rehab Services screen #2               Passed - Last BP reading less than 140/90     BP Readings from Last 1 Encounters:  11/15/22 : 134/58              Passed - CMP or BMP in past 6 months     Recent Results (from the past 4392 hour(s))   COMP METABOLIC PANEL (14)    Collection Time: 10/20/22  2:03 PM   Result Value Ref Range    Glucose 74 70 - 99 mg/dL    Sodium 140 136 - 145 mmol/L    Potassium 3.9 3.5 - 5.1 mmol/L    Chloride 106 98 - 112 mmol/L    CO2 28.0 21.0 - 32.0 mmol/L    Anion Gap 6 0 - 18 mmol/L    BUN 15 7 - 18 mg/dL    Creatinine 0.60 0.55 - 1.02 mg/dL    BUN/CREA Ratio 25.0 (H) 10.0 - 20.0    Calcium, Total 10.4 (H) 8.5 - 10.1 mg/dL    Calculated Osmolality 289 275 - 295 mOsm/kg    eGFR-Cr 96 >=60 mL/min/1.73m2    ALT 29 13 - 56 U/L    AST 16 15 - 37 U/L    Alkaline Phosphatase 87 55 - 142 U/L    Bilirubin, Total 0.8 0.1 - 2.0 mg/dL    Total Protein 7.6 6.4 - 8.2 g/dL    Albumin 3.9 3.4 - 5.0 g/dL    Globulin  3.7 2.8 - 4.4 g/dL    A/G Ratio 1.1 1.0 - 2.0    Patient Fasting for CMP? No      *Note: Due to a large number of results and/or encounters for the requested time period, some results have not been displayed. A complete set of results can be found in Results Review.                Passed - In person appointment or virtual visit in the past 6 months     Recent Outpatient Visits              2 days ago     [address] [place] [address]    Office Visit    2 weeks ago Preop examination    [address] [facility], [address] [place] [address], [address] [place] [address] Visit    3 weeks ago Invasive ductal carcinoma of breast, left [facility])    [address] [place] [address] Jennifer Khan MD    Office Visit    3 weeks ago Malignant neoplasm of overlapping sites of left breast in female, estrogen receptor positive [facility])    [facility] AND [facility] Hematology Oncology Gee Garrido MD    Office Visit    3 weeks ago Encounter for routine adult health examination without abnormal findings    [address] [facility], [facility], [facility] Visit          Future Appointments         Provider Department Appt Notes    In 5 days Aniya Castillo 19 Hematology Oncology post lumpectomy f/u    In 5 days Charley Chau, 50 [facility] Surgical Oncology Group aware date time location with Clinton Higgins   1st post op    In 2 weeks MD Toshia Holcomb [facility] Group 2nd post op    In 3 weeks Julio Flores, [facility] [address] screen #2               Passed - [facility] or [facility] > 50     GFR Evaluation  EGFRCR: 96 , resulted on 10/20/2022              Recent Outpatient Visits              2 days ago     [address] [facility], [facility]    Office Visit    2 weeks ago Preop examination    [address], [place]    Office Visit    3 weeks ago Invasive ductal carcinoma of breast, left [facility])    [address] [place] [address] Dai Fry MD    Office Visit    3 weeks ago Malignant neoplasm of overlapping sites of left breast in female, estrogen receptor positive [facility])    [facility] AND [facility] Hematology Oncology Jagdeep Gu MD    Office Visit    3 weeks ago Encounter for routine adult health examination without abnormal findings    [address], [address] [place] [address] Visit           Future Appointments         Provider Department Appt Notes    In 5 days Aniya Friend 19 Hematology Oncology post lumpectomy f/u    In 5 days Martha Ni, 50 [facility] Surgical Oncology Group aware date time location with Linus Rehman   1st post op    In 2 weeks Dai Fry MD [facility] Surgical Oncology Group 2nd post op    In 3 weeks Julio Flores, [facility] [address] screen #2

## 2022-11-16 NOTE — OPERATIVE REPORT
[facility]    PATIENT'S NAME: Eneida BRONSON   ATTENDING PHYSICIAN: Argelia Fernandes M.D. OPERATING PHYSICIAN: Argelia Fernandes M.D. PATIENT ACCOUNT#:   [de-identified]    LOCATION:  [address] [place] [address]  MEDICAL RECORD #:   [id number]       YOB: 1950  ADMISSION DATE:       11/15/2022      OPERATION DATE:  11/15/2022    OPERATIVE REPORT    PREOPERATIVE DIAGNOSIS:  Left breast cancer. POSTOPERATIVE DIAGNOSIS:  Left breast cancer. PROCEDURE:  Left breast wire-localized lumpectomy with left breast specimen radiography, left breast advancement flap mastopexy for a defect measuring 12 sq cm, left breast injection of blue dye for sentinel lymph node identification, and left sentinel lymph node biopsy. ASSISTANT:  France Toribio CSA. ANESTHESIA:  General anesthesia and local.    ESTIMATED BLOOD LOSS:  10 mL. DRAINS:  None. COMPLICATIONS:  None. DISPOSITION:  Stable on transfer to the recovery room. INDICATIONS:  The patient is a 70-year-old female who is presenting with an imaging-detected concern of her left breast.  She was found on further diagnostic workup to have an 8 mm suspicious mass and a biopsy confirmed diagnosis of invasive carcinoma. We discussed local treatment options. She was motivated for a breast-conserving approach. We discussed the need to achieve free margins, the possibility of re-excision to achieve free margins as well as the possible need for postoperative systemic and radiation therapy. The risks and possible complications were discussed with the patient including but not limited to infection, bleeding, injury to the surrounding structures, and possible need for reoperation. She agreed to the proposed surgery.      OPERATIVE TECHNIQUE:  The patient was brought to the imaging suite, underwent a wire localization of the area of concern in the left breast.  She was then brought to the nuclear medicine department where she underwent injection of radioisotope as well as lymphoscintigraphy for sentinel lymph node identification preoperatively. She was brought to the OR, placed in supine position, properly padded and secured, given a dose of IV antibiotics, and sequential compression devices were applied to legs for DVT prophylaxis. General anesthesia was induced. Diluted methylene blue dye was injected in a periareolar location to the left breast and massaged for approximately 5 minutes, and the breasts and arm were then prepped and draped in the usual sterile fashion. Attention was first taken toward the axilla. Then, 1% lidocaine with epinephrine was used to infiltrate the skin and subcutaneous tissues at the targeted incision site. A transverse incision was made with a 15-blade knife at the inferior aspect of the left axillary hairline after confirming uptake with a handheld gamma counter, and sharp dissection with electrocautery was used to dissect the various layers of the axilla including dissection through the clavipectoral fascia into the deep axilla where her sentinel node was identified and harvested. A total of 1 node was removed as sentinel node. This was sent for permanent pathologic evaluation. The wound was irrigated. Hemostasis was assured with electrocautery and hemoclips, and closure was accomplished with an interrupted 3-0 Vicryl for deep layer, running 4-0 subcuticular Monocryl for the skin. Mastisol and Steri-Strips were applied. Then, 0.5% Marcaine was instilled in the cavity to assist with postoperative analgesia. Attention was taken toward the breast.  Then, 1% lidocaine with epinephrine was used to infiltrate the skin and subcutaneous tissue at the targeted lumpectomy incision site. A curvilinear incision was made along the lateral areolar border with a 15-blade knife in the skin. The wire was identified, brought into the field.   Using sharp dissection and electrocautery, a segment of breast tissue surrounding the tip of the wire was carefully excised, oriented with a short stitch, single clip superiorly and long stitch, double clip laterally; placed on the imaging device where specimen x-ray confirmed the presence of the targeted clip, residual area with adequate margins as deemed by myself. Using sharp dissection and electrocautery, 6 margins were then taken from the interior of the lumpectomy cavity. Each was marked with a clip at the true margin, individually labeled, and sent for permanent pathologic evaluation. The cavity was irrigated with warm sterile saline. Hemostasis was assured with electrocautery. Hemoclips were placed around the periphery of the cavity to assist in subsequent radiation planning and surveillance. She was found to have a large defect measuring at least 12 sq cm in the central lateral left breast thought to impair both optimal wound healing and cosmesis, which we proceeded with an advancement flap mastopexy. This required a counterincision to be placed through immediately adjacent superolateral breast parenchyma down to the level of pectoralis fascia via lateral vascular pedicle tissue. It was then mobilized into the aforementioned defect and secured to the level of pectoralis fascia with a running 3-0 PDS suture. Her wound was then closed with an interrupted 3-0 Vicryl for deep layer, running 4-0 subcuticular Monocryl for skin. Mastisol and Steri-Strips were applied. Then, 0.5% Marcaine was instilled in the cavity to assist with postoperative analgesia. Sterile dressing and compression bra were placed. Her blood loss was minimal.  All counts were correct at the conclusion of the procedure. She tolerated the procedure well. She was transferred to recovery area in stable condition. Dictated By Irina Salcedo.  Mali Saleem M.D.  d: 11/15/2022 18:01:14  t: 11/16/2022 03:26:26  Job [id number]  CMG/    cc: Dr. Padmini Bolanos MD

## 2022-11-21 ENCOUNTER — OFFICE VISIT (OUTPATIENT)
Dept: SURGERY | Facility: CLINIC | Age: 72
End: 2022-11-21
Payer: COMMERCIAL

## 2022-11-21 ENCOUNTER — OFFICE VISIT (OUTPATIENT)
Dept: HEMATOLOGY/ONCOLOGY | Facility: HOSPITAL | Age: 72
End: 2022-11-21
Attending: INTERNAL MEDICINE
Payer: COMMERCIAL

## 2022-11-21 VITALS
SYSTOLIC BLOOD PRESSURE: 168 MMHG | BODY MASS INDEX: 30.45 KG/M2 | TEMPERATURE: 98 F | WEIGHT: 178.38 LBS | RESPIRATION RATE: 16 BRPM | HEART RATE: 84 BPM | HEIGHT: 64 IN | OXYGEN SATURATION: 98 % | DIASTOLIC BLOOD PRESSURE: 75 MMHG

## 2022-11-21 VITALS
BODY MASS INDEX: 30.45 KG/M2 | WEIGHT: 178.38 LBS | TEMPERATURE: 98 F | HEART RATE: 98 BPM | HEIGHT: 64 IN | SYSTOLIC BLOOD PRESSURE: 162 MMHG | DIASTOLIC BLOOD PRESSURE: 77 MMHG | RESPIRATION RATE: 16 BRPM | OXYGEN SATURATION: 98 %

## 2022-11-21 DIAGNOSIS — C50.812 MALIGNANT NEOPLASM OF OVERLAPPING SITES OF LEFT BREAST IN FEMALE, ESTROGEN RECEPTOR POSITIVE (HCC): Primary | ICD-10-CM

## 2022-11-21 DIAGNOSIS — Z17.0 MALIGNANT NEOPLASM OF OVERLAPPING SITES OF LEFT BREAST IN FEMALE, ESTROGEN RECEPTOR POSITIVE (HCC): Primary | ICD-10-CM

## 2022-11-21 DIAGNOSIS — Z78.0 POST-MENOPAUSAL: ICD-10-CM

## 2022-11-21 PROCEDURE — 3077F SYST BP >= 140 MM HG: CPT

## 2022-11-21 PROCEDURE — 99214 OFFICE O/P EST MOD 30 MIN: CPT | Performed by: INTERNAL MEDICINE

## 2022-11-21 PROCEDURE — 3077F SYST BP >= 140 MM HG: CPT | Performed by: INTERNAL MEDICINE

## 2022-11-21 PROCEDURE — 3078F DIAST BP <80 MM HG: CPT | Performed by: INTERNAL MEDICINE

## 2022-11-21 PROCEDURE — 3008F BODY MASS INDEX DOCD: CPT | Performed by: INTERNAL MEDICINE

## 2022-11-21 PROCEDURE — 3078F DIAST BP <80 MM HG: CPT

## 2022-11-21 PROCEDURE — 3008F BODY MASS INDEX DOCD: CPT

## 2022-11-21 PROCEDURE — 99024 POSTOP FOLLOW-UP VISIT: CPT

## 2022-11-21 RX ORDER — LETROZOLE 2.5 MG/1
2.5 TABLET, FILM COATED ORAL DAILY
Qty: 30 TABLET | Refills: 6 | Status: SHIPPED | OUTPATIENT
Start: 2022-11-21

## 2022-11-27 ENCOUNTER — PATIENT MESSAGE (OUTPATIENT)
Dept: FAMILY MEDICINE CLINIC | Facility: CLINIC | Age: 72
End: 2022-11-27

## 2022-11-30 ENCOUNTER — TELEPHONE (OUTPATIENT)
Dept: SURGERY | Facility: CLINIC | Age: 72
End: 2022-11-30

## 2022-11-30 ENCOUNTER — OFFICE VISIT (OUTPATIENT)
Dept: SURGERY | Facility: CLINIC | Age: 72
End: 2022-11-30
Payer: COMMERCIAL

## 2022-11-30 VITALS
RESPIRATION RATE: 18 BRPM | WEIGHT: 178.38 LBS | SYSTOLIC BLOOD PRESSURE: 156 MMHG | TEMPERATURE: 98 F | BODY MASS INDEX: 31 KG/M2 | HEART RATE: 82 BPM | OXYGEN SATURATION: 99 % | DIASTOLIC BLOOD PRESSURE: 80 MMHG

## 2022-11-30 DIAGNOSIS — C50.812 MALIGNANT NEOPLASM OF OVERLAPPING SITES OF LEFT BREAST IN FEMALE, ESTROGEN RECEPTOR POSITIVE (HCC): ICD-10-CM

## 2022-11-30 DIAGNOSIS — Z17.0 MALIGNANT NEOPLASM OF OVERLAPPING SITES OF LEFT BREAST IN FEMALE, ESTROGEN RECEPTOR POSITIVE (HCC): ICD-10-CM

## 2022-11-30 DIAGNOSIS — N64.89 SEROMA OF BREAST: Primary | ICD-10-CM

## 2022-11-30 PROCEDURE — 87070 CULTURE OTHR SPECIMN AEROBIC: CPT | Performed by: SURGERY

## 2022-11-30 PROCEDURE — 87205 SMEAR GRAM STAIN: CPT | Performed by: SURGERY

## 2022-11-30 PROCEDURE — 99024 POSTOP FOLLOW-UP VISIT: CPT | Performed by: SURGERY

## 2022-11-30 PROCEDURE — 3079F DIAST BP 80-89 MM HG: CPT | Performed by: SURGERY

## 2022-11-30 PROCEDURE — 3077F SYST BP >= 140 MM HG: CPT | Performed by: SURGERY

## 2022-11-30 RX ORDER — IBUPROFEN 200 MG
200 TABLET ORAL EVERY 6 HOURS PRN
COMMUNITY

## 2022-11-30 RX ORDER — ACETAMINOPHEN 325 MG/1
325 TABLET ORAL EVERY 6 HOURS PRN
COMMUNITY

## 2022-11-30 NOTE — TELEPHONE ENCOUNTER
Called patient to inform of preliminary culture results. Patient is aware that we will still await final pathology results and adjust plan accordingly. Patient verbalized understanding and was appreciative of the call.

## 2022-12-07 ENCOUNTER — OFFICE VISIT (OUTPATIENT)
Dept: SURGERY | Facility: CLINIC | Age: 72
End: 2022-12-07
Payer: COMMERCIAL

## 2022-12-07 VITALS
BODY MASS INDEX: 31 KG/M2 | WEIGHT: 179 LBS | DIASTOLIC BLOOD PRESSURE: 74 MMHG | SYSTOLIC BLOOD PRESSURE: 161 MMHG | HEART RATE: 80 BPM | TEMPERATURE: 97 F | RESPIRATION RATE: 18 BRPM | OXYGEN SATURATION: 98 %

## 2022-12-07 DIAGNOSIS — N64.89 SEROMA OF BREAST: Primary | ICD-10-CM

## 2022-12-07 DIAGNOSIS — Z17.0 MALIGNANT NEOPLASM OF OVERLAPPING SITES OF LEFT BREAST IN FEMALE, ESTROGEN RECEPTOR POSITIVE (HCC): ICD-10-CM

## 2022-12-07 DIAGNOSIS — C50.812 MALIGNANT NEOPLASM OF OVERLAPPING SITES OF LEFT BREAST IN FEMALE, ESTROGEN RECEPTOR POSITIVE (HCC): ICD-10-CM

## 2022-12-07 PROCEDURE — 99024 POSTOP FOLLOW-UP VISIT: CPT | Performed by: SURGERY

## 2022-12-07 PROCEDURE — 3077F SYST BP >= 140 MM HG: CPT | Performed by: SURGERY

## 2022-12-07 PROCEDURE — 3078F DIAST BP <80 MM HG: CPT | Performed by: SURGERY

## 2022-12-08 ENCOUNTER — TELEPHONE (OUTPATIENT)
Dept: PHYSICAL THERAPY | Facility: HOSPITAL | Age: 72
End: 2022-12-08

## 2022-12-12 ENCOUNTER — OFFICE VISIT (OUTPATIENT)
Dept: PHYSICAL THERAPY | Facility: HOSPITAL | Age: 72
End: 2022-12-12
Attending: SURGERY
Payer: COMMERCIAL

## 2022-12-12 DIAGNOSIS — I89.0 LYMPHEDEMA: ICD-10-CM

## 2022-12-12 DIAGNOSIS — L90.5 AXILLARY WEB SYNDROME: ICD-10-CM

## 2022-12-12 DIAGNOSIS — L76.82 AXILLARY WEB SYNDROME: ICD-10-CM

## 2022-12-12 PROCEDURE — 97140 MANUAL THERAPY 1/> REGIONS: CPT | Performed by: PHYSICAL THERAPIST

## 2022-12-12 PROCEDURE — 97161 PT EVAL LOW COMPLEX 20 MIN: CPT | Performed by: PHYSICAL THERAPIST

## 2022-12-13 ENCOUNTER — APPOINTMENT (OUTPATIENT)
Dept: PHYSICAL THERAPY | Facility: HOSPITAL | Age: 72
End: 2022-12-13
Attending: SURGERY
Payer: COMMERCIAL

## 2022-12-15 ENCOUNTER — OFFICE VISIT (OUTPATIENT)
Dept: PHYSICAL THERAPY | Facility: HOSPITAL | Age: 72
End: 2022-12-15
Attending: SURGERY
Payer: COMMERCIAL

## 2022-12-15 PROCEDURE — 97140 MANUAL THERAPY 1/> REGIONS: CPT

## 2022-12-19 ENCOUNTER — OFFICE VISIT (OUTPATIENT)
Dept: PHYSICAL THERAPY | Facility: HOSPITAL | Age: 72
End: 2022-12-19
Attending: SURGERY
Payer: COMMERCIAL

## 2022-12-19 PROCEDURE — 97140 MANUAL THERAPY 1/> REGIONS: CPT | Performed by: PHYSICAL THERAPIST

## 2022-12-21 ENCOUNTER — OFFICE VISIT (OUTPATIENT)
Dept: PHYSICAL THERAPY | Facility: HOSPITAL | Age: 72
End: 2022-12-21
Attending: SURGERY
Payer: COMMERCIAL

## 2022-12-21 PROCEDURE — 97140 MANUAL THERAPY 1/> REGIONS: CPT

## 2022-12-27 ENCOUNTER — TELEPHONE (OUTPATIENT)
Dept: PHYSICAL THERAPY | Facility: HOSPITAL | Age: 72
End: 2022-12-27

## 2022-12-28 ENCOUNTER — OFFICE VISIT (OUTPATIENT)
Dept: PHYSICAL THERAPY | Facility: HOSPITAL | Age: 72
End: 2022-12-28
Attending: FAMILY MEDICINE
Payer: COMMERCIAL

## 2022-12-28 PROCEDURE — 97140 MANUAL THERAPY 1/> REGIONS: CPT

## 2022-12-29 ENCOUNTER — APPOINTMENT (OUTPATIENT)
Dept: PHYSICAL THERAPY | Facility: HOSPITAL | Age: 72
End: 2022-12-29
Attending: SURGERY
Payer: COMMERCIAL

## 2023-01-03 ENCOUNTER — OFFICE VISIT (OUTPATIENT)
Dept: HEMATOLOGY/ONCOLOGY | Facility: HOSPITAL | Age: 73
End: 2023-01-03
Attending: NURSE PRACTITIONER
Payer: COMMERCIAL

## 2023-01-03 DIAGNOSIS — Z71.9 COUNSELING, UNSPECIFIED: ICD-10-CM

## 2023-01-03 DIAGNOSIS — Z85.3 PERSONAL HISTORY OF BREAST CANCER: Primary | ICD-10-CM

## 2023-01-03 DIAGNOSIS — Z08 ENCOUNTER FOR FOLLOW-UP EXAMINATION AFTER COMPLETED TREATMENT FOR MALIGNANT NEOPLASM: ICD-10-CM

## 2023-01-03 NOTE — PROGRESS NOTES
I met with Traci Arana for a Survivorship Clinic visit to provide a survivorship care plan (SCP) and information related to post-treatment care. She was diagnosed in 10/2022 with an IDC of the left breast.  On 11/15/2022 Dr. Jus Leong performed a left lumpectomy with sentinel lymph node biopsy. Pathology showed a pT1b, pN0(sn), cM0, G1, ER+, MS+, HER2-, Stage IA left breast cancer. She was seen by Dr. Bulmaro Lambert. No chemotherapy or radiation therapy were recommended. She was prescribed endocrine therapy with Letrozole that she started in December 2022. (See Oncology Treatment Summary SCP for details). Current condition/issues: Traci Arana is doing well post-treatment. She developed axillary cording and is being seen by PT. She has one persistent area of cording extending from upper left arm to the wrist.  Her ROM is good, but notes tightness with certain movements. She had previous left axillary seroma drainage's x 2, but that has resolved. She has noted left breast swelling and mild discomfort under her breast and PT is managing this. She started Letrozole in December, takes each evening and has no reported side effects. She has not had a baseline bone density yet and plans to schedule soon. She is very active, working full-time at [employer] with a good deal of walking each day. In addition to this, she plans to resume walking with her daughter 4-5 times/week and walks her dog twice daily. She has started stretching exercises for some right hip discomfort and has found this helpful. She eats healthy, focused on plant based foods. Current BMI is 31. She hopes to lose weight. She reports having good support with her 4 daughters. She admits to having initial anxiety at diagnosis, but this has improved with time. She reports feeling grateful with her early stage diagnosis. She sleeps 7-8 hours/night.      Survivorship Care Plan and letter: She was provided a hard copy of the SCP and a letter that outlined the purpose of the visit and plan. We reviewed all elements of both documents. She is aware that a letter and SCP will be sent to her PCP, Dr. Alo Barton. Reviewed Cancer Surveillance (office visits, imaging, bone density) and that Dr. Alona Ortiz will oversee this care. She will get left mammogram scheduled in May and will see Dr. Lea Noyola on 5/25. She needs to schedule with Dr. Vicki Rosa for late May/early June. She was provided orders for both the bone density and mammogram and instructed on how to schedule. She will continue to see PT for management of post-surgical issues and has next visit on 1/6. Reviewed Schedule of other clinic visits with Primary Care and specialists: Dr. Moni Vazquez will continue to manage all general health care recommended for age and gender including cancer screening tests. She is due for colon FIT test and has the order from Dr. Moni Vazquez. She was encouraged to continue to see specialists at usual intervals. Reviewed concerning symptoms that she should report to any Provider. Reviewed possible late and long-term effects related to the treatment that was received. We reviewed care of the surgical arm. She was provided resources on management of hormone related side effects, but is currently tolerating very well. Reviewed common cancer survivor issues and resources available. Various survivorship resources were provided to use going forward as needed (see below). Reviewed Lifestyle/behaviors that can affect ongoing health, including the risk for the cancer coming back or developing another cancer. We reviewed importance of physical activity with aerobic, strength training and weight bearing exercise. She has always exercised and is already compliant with this. We reviewed a plant based diet, which she is currently doing. We reviewed skin care and sun safety.      The patient received a take-home folder that included the following survivorship resources:   -SCP and patient letter  -[facility] [place] in [facility]: nutrition and exercise classes, mind/body programs, education, support groups  -[facility] in [place], support groups, special programs, nutrition and exercise classes, movement classes, mind/body programs, volunteer opportunities, survivorship programs, individual counseling  -[employer]h Loss Program  -Jump Start Program  -[employer] handout-nutrition, physical activity  -ACS Cancer Screening Guidelines  -Websites: [address] for your Life, Living Beyond Breast Cancer, Facebook: [employer], Endocrine therapy, Jose Ale[facility] [place], [address] [place] [address] [place] for [address] [place] [address] was given the opportunity to ask questions. She had a few questions and verbalized understanding of the information we discussed today. My total time spent caring for the patient on the day of the encounter: 85 minutes (10 minutes reviewing chart, 65 minutes of face to face counseling regarding survivorship education, review of care plan and additional resources and 10 minutes of documentation). She was encouraged to call with any further questions.    PAWAN Fox

## 2023-01-06 ENCOUNTER — OFFICE VISIT (OUTPATIENT)
Dept: PHYSICAL THERAPY | Facility: HOSPITAL | Age: 73
End: 2023-01-06
Attending: SURGERY
Payer: COMMERCIAL

## 2023-01-06 PROCEDURE — 97140 MANUAL THERAPY 1/> REGIONS: CPT | Performed by: PHYSICAL THERAPIST

## 2023-01-11 ENCOUNTER — OFFICE VISIT (OUTPATIENT)
Dept: PHYSICAL THERAPY | Facility: HOSPITAL | Age: 73
End: 2023-01-11
Attending: SURGERY
Payer: COMMERCIAL

## 2023-01-11 PROCEDURE — 97140 MANUAL THERAPY 1/> REGIONS: CPT | Performed by: PHYSICAL THERAPIST

## 2023-01-18 ENCOUNTER — OFFICE VISIT (OUTPATIENT)
Dept: PHYSICAL THERAPY | Facility: HOSPITAL | Age: 73
End: 2023-01-18
Attending: SURGERY
Payer: COMMERCIAL

## 2023-01-18 PROCEDURE — 97140 MANUAL THERAPY 1/> REGIONS: CPT | Performed by: PHYSICAL THERAPIST

## 2023-02-01 ENCOUNTER — OFFICE VISIT (OUTPATIENT)
Dept: PHYSICAL THERAPY | Facility: HOSPITAL | Age: 73
End: 2023-02-01
Attending: SURGERY
Payer: COMMERCIAL

## 2023-02-01 PROCEDURE — 97140 MANUAL THERAPY 1/> REGIONS: CPT | Performed by: PHYSICAL THERAPIST

## 2023-02-10 ENCOUNTER — OFFICE VISIT (OUTPATIENT)
Dept: PHYSICAL THERAPY | Facility: HOSPITAL | Age: 73
End: 2023-02-10
Attending: SURGERY
Payer: COMMERCIAL

## 2023-02-10 PROCEDURE — 97140 MANUAL THERAPY 1/> REGIONS: CPT | Performed by: PHYSICAL THERAPIST

## 2023-02-14 ENCOUNTER — OFFICE VISIT (OUTPATIENT)
Dept: PHYSICAL THERAPY | Facility: HOSPITAL | Age: 73
End: 2023-02-14
Attending: SURGERY
Payer: COMMERCIAL

## 2023-02-14 PROCEDURE — 97140 MANUAL THERAPY 1/> REGIONS: CPT | Performed by: PHYSICAL THERAPIST

## 2023-02-21 ENCOUNTER — OFFICE VISIT (OUTPATIENT)
Dept: PHYSICAL THERAPY | Facility: HOSPITAL | Age: 73
End: 2023-02-21
Attending: SURGERY
Payer: COMMERCIAL

## 2023-02-21 PROCEDURE — 97140 MANUAL THERAPY 1/> REGIONS: CPT | Performed by: PHYSICAL THERAPIST

## 2023-02-27 ENCOUNTER — OFFICE VISIT (OUTPATIENT)
Dept: PHYSICAL THERAPY | Facility: HOSPITAL | Age: 73
End: 2023-02-27
Attending: SURGERY
Payer: COMMERCIAL

## 2023-02-27 PROCEDURE — 97140 MANUAL THERAPY 1/> REGIONS: CPT

## 2023-03-08 ENCOUNTER — OFFICE VISIT (OUTPATIENT)
Dept: PHYSICAL THERAPY | Facility: HOSPITAL | Age: 73
End: 2023-03-08
Attending: SURGERY
Payer: COMMERCIAL

## 2023-03-08 PROCEDURE — 97140 MANUAL THERAPY 1/> REGIONS: CPT

## 2023-03-17 ENCOUNTER — HOSPITAL ENCOUNTER (OUTPATIENT)
Dept: BONE DENSITY | Facility: HOSPITAL | Age: 73
Discharge: HOME | End: 2023-03-17
Attending: INTERNAL MEDICINE
Payer: COMMERCIAL

## 2023-03-17 DIAGNOSIS — Z78.0 POST-MENOPAUSAL: ICD-10-CM

## 2023-03-17 PROCEDURE — 77080 DXA BONE DENSITY AXIAL: CPT | Performed by: INTERNAL MEDICINE

## 2023-03-19 DIAGNOSIS — M85.80 OSTEOPENIA AFTER MENOPAUSE: Primary | ICD-10-CM

## 2023-03-19 DIAGNOSIS — Z78.0 OSTEOPENIA AFTER MENOPAUSE: Primary | ICD-10-CM

## 2023-03-22 ENCOUNTER — OFFICE VISIT (OUTPATIENT)
Dept: PHYSICAL THERAPY | Facility: HOSPITAL | Age: 73
End: 2023-03-22
Attending: SURGERY
Payer: COMMERCIAL

## 2023-03-22 PROCEDURE — 97140 MANUAL THERAPY 1/> REGIONS: CPT

## 2023-04-20 ENCOUNTER — OFFICE VISIT (OUTPATIENT)
Dept: PHYSICAL THERAPY | Facility: HOSPITAL | Age: 73
End: 2023-04-20
Attending: SURGERY
Payer: COMMERCIAL

## 2023-04-20 PROCEDURE — 97140 MANUAL THERAPY 1/> REGIONS: CPT | Performed by: PHYSICAL THERAPIST

## 2023-05-18 ENCOUNTER — HOSPITAL ENCOUNTER (OUTPATIENT)
Dept: ULTRASOUND IMAGING | Facility: HOSPITAL | Age: 73
Discharge: HOME | End: 2023-05-18
Attending: INTERNAL MEDICINE
Payer: COMMERCIAL

## 2023-05-18 ENCOUNTER — HOSPITAL ENCOUNTER (OUTPATIENT)
Dept: MAMMOGRAPHY | Facility: HOSPITAL | Age: 73
Discharge: HOME | End: 2023-05-18
Attending: INTERNAL MEDICINE
Payer: COMMERCIAL

## 2023-05-18 DIAGNOSIS — Z17.0 MALIGNANT NEOPLASM OF OVERLAPPING SITES OF LEFT BREAST IN FEMALE, ESTROGEN RECEPTOR POSITIVE (HCC): ICD-10-CM

## 2023-05-18 DIAGNOSIS — C50.812 MALIGNANT NEOPLASM OF OVERLAPPING SITES OF LEFT BREAST IN FEMALE, ESTROGEN RECEPTOR POSITIVE (HCC): ICD-10-CM

## 2023-05-18 PROCEDURE — 77065 DX MAMMO INCL CAD UNI: CPT | Performed by: INTERNAL MEDICINE

## 2023-05-18 PROCEDURE — 76642 ULTRASOUND BREAST LIMITED: CPT | Performed by: INTERNAL MEDICINE

## 2023-05-18 PROCEDURE — 77061 BREAST TOMOSYNTHESIS UNI: CPT | Performed by: INTERNAL MEDICINE

## 2023-05-22 RX ORDER — METOPROLOL SUCCINATE 25 MG/1
25 TABLET, EXTENDED RELEASE ORAL DAILY
Qty: 90 TABLET | Refills: 1 | Status: SHIPPED | OUTPATIENT
Start: 2023-05-22

## 2023-05-22 RX ORDER — ENALAPRIL MALEATE 5 MG/1
5 TABLET ORAL DAILY
Qty: 90 TABLET | Refills: 1 | Status: SHIPPED | OUTPATIENT
Start: 2023-05-22

## 2023-05-22 NOTE — TELEPHONE ENCOUNTER
Protocol failed or has No Protocol, please review  Requested Prescriptions   Pending Prescriptions Disp Refills    METFORMIN HCL 1000 MG Oral Tab [Pharmacy Med Name: METFORMIN HCL 1,000 MG TABLET] 180 tablet 1     Sig: TAKE 1 TABLET BY MOUTH TWICE A DAY WITH MEALS       Diabetes Medication Protocol Failed - 5/21/2023  7:42 AM        Failed - Last A1C < 7.5 and within past 6 months     Lab Results   Component Value Date    A1C 6.1 (H) 10/20/2022               Failed - In person appointment or virtual visit in the past 6 mos or appointment in next 3 mos     Recent Outpatient Visits              1 month ago     [facility], [facility], [address] [place] Visit    2 months ago     [address] [place], [place]    Office Visit    2 months ago     [address] [place]    Office Visit    2 months ago     [address] [place], [place]    Office Visit    3 months ago     [facility], [facility]    Office Visit          Future Appointments         Provider Department Appt Notes    In 3 days Rosette Sever, [address] Hematology Oncology follow up visit. cl  6m    In 2 weeks Ryan Gilman MD [address], [place] CONFIRMED thru answering service  LM TO CONFIRM DATE AND TIME               Passed - EGFRCR or GFRNAA > 50     GFR Evaluation  EGFRCR: 96 , resulted on 10/20/2022            Passed - GFR in the past 12 months          ENALAPRIL 5 MG Oral Tab [Pharmacy Med Name: ENALAPRIL MALEATE 5 MG TABLET] 90 tablet 1     Sig: Take 1 tablet (5 mg total) by mouth daily.        Hypertensive Medications Protocol Failed - 5/21/2023  7:42 AM        Failed - Last BP reading less than 140/90     BP Readings from Last 1 Encounters:  12/07/22 : (!) 161/74                Failed - CMP or BMP in past 6 months     No results found for this or any previous visit (from the past 4392 hour(s)). Failed - In person appointment or virtual visit in the past 6 months     Recent Outpatient Visits              1 month ago     [employer] [place], [place]    Office Visit    2 months ago     [address] [place], [place]    Office Visit    2 months ago     [address] [place], [place]    Office Visit    2 months ago     [address] [place], [place]    Office Visit    3 months ago     [address] [place], [place]    Office Visit          Future Appointments         Provider Department Appt Notes    In 3 days Aniya Kang 19 Hematology Oncology follow up visit. cl  6m    In 2 weeks Lucrecia Shipley MD [address], [address] [place] [address] CONFIRMED thru answering service  LM TO CONFIRM DATE AND TIME               Passed - In person appointment in the past 12 or next 3 months     Recent Outpatient Visits              1 month ago     [employer], [place]    Office Visit    2 months ago     [address] [place], [place]    Office Visit    2 months ago     [address] [place], [place]    Office Visit    2 months ago     [address] [place], [place]    Office Visit    3 months ago     [address] [place], [place]    Office Visit          Future Appointments         Provider Department Appt Notes    In 3 days Aniya [address] 19 Hematology Oncology follow up visit. cl  6m    In 2 weeks Lucrecia Shipley MD [address] thru answering service  LM TO CONFIRM DATE AND TIME               Passed [facility] or GFRNAA > 50     GFR Evaluation  EGFRCR: 96 , resulted on 10/20/2022              METOPROLOL SUCCINATE ER 25 MG Oral Tablet [address] [Pharmacy Med Name: METOPROLOL SUCC ER 25 MG TAB] 90 tablet 1     Sig: Take 1 tablet (25 mg total) by mouth daily. Hypertensive Medications Protocol Failed - 5/21/2023  7:42 AM        Failed - Last BP reading less than 140/90     BP Readings from Last 1 Encounters:  12/07/22 : (!) 161/74                Failed - CMP or BMP in past 6 months     No results found for this or any previous visit (from the past 4392 hour(s)). Failed - In person appointment or virtual visit in the past 6 months     Recent Outpatient Visits              1 month ago     [place], [place]    Office Visit    2 months ago     [address] [place], [place]    Office Visit    2 months ago     [address] [place] [address]    Office Visit    2 months ago     [address] [place], [place]    Office Visit    3 months ago     [address] [place], [place]    Office Visit          Future Appointments         Provider Department Appt Notes    In 3 days Aniya Lauren 19 Hematology Oncology follow up visit. cl  6m    In 2 weeks Mushtaq Neal MD [address] [place] [address] CONFIRMED thru answering service  LM TO CONFIRM DATE AND TIME               Passed - In person appointment in the past 12 or next 3 months     Recent Outpatient Visits              1 month ago     [address] [place], [place]    Office Visit    2 months ago     [address] [place], [place]    Office Visit    2 months ago     [address] [place], [place]    Office Visit    2 months ago     [address] [place], [place]    Office Visit    3 months ago     [address] [place], [place]    Office Visit          Future Appointments         Provider Department Appt Notes    In 3 days Aniya Mata 19 Hematology Oncology follow up visit. cl  6m    In 2 weeks Teresita Higgins MD [address] thru answering service  LM TO CONFIRM DATE AND TIME               Passed [facility] or GFRNAA > 50     GFR Evaluation  EGFRCR: 96 , resulted on 10/20/2022               Future Appointments         Provider Department Appt Notes    In 3 days Aniya Mata 19 Hematology Oncology follow up visit. cl  6m    In 2 weeks Teresita Higgins MD [address], [facility], [address] thru answering service  LM TO CONFIRM DATE AND TIME          Recent Outpatient Visits              1 month ago     [facility] [place], [place]    Office Visit    2 months ago     [facility] [place], [place]    Office Visit    2 months ago     [address] [place], [place]    Office Visit    2 months ago     [address] [place], [place]    Office Visit    3 months ago     [address] [place], [place]    Office Visit

## 2023-05-25 ENCOUNTER — NURSE ONLY (OUTPATIENT)
Dept: HEMATOLOGY/ONCOLOGY | Facility: HOSPITAL | Age: 73
End: 2023-05-25
Attending: INTERNAL MEDICINE
Payer: COMMERCIAL

## 2023-05-25 VITALS
RESPIRATION RATE: 18 BRPM | WEIGHT: 172 LBS | DIASTOLIC BLOOD PRESSURE: 71 MMHG | BODY MASS INDEX: 29.37 KG/M2 | TEMPERATURE: 98 F | HEIGHT: 64 IN | SYSTOLIC BLOOD PRESSURE: 177 MMHG | OXYGEN SATURATION: 97 % | HEART RATE: 78 BPM

## 2023-05-25 DIAGNOSIS — Z78.0 OSTEOPENIA AFTER MENOPAUSE: ICD-10-CM

## 2023-05-25 DIAGNOSIS — Z08 ENCOUNTER FOR FOLLOW-UP SURVEILLANCE OF BREAST CANCER: ICD-10-CM

## 2023-05-25 DIAGNOSIS — M85.80 OSTEOPENIA AFTER MENOPAUSE: ICD-10-CM

## 2023-05-25 DIAGNOSIS — Z17.0 MALIGNANT NEOPLASM OF OVERLAPPING SITES OF LEFT BREAST IN FEMALE, ESTROGEN RECEPTOR POSITIVE (HCC): Primary | ICD-10-CM

## 2023-05-25 DIAGNOSIS — C50.812 MALIGNANT NEOPLASM OF OVERLAPPING SITES OF LEFT BREAST IN FEMALE, ESTROGEN RECEPTOR POSITIVE (HCC): Primary | ICD-10-CM

## 2023-05-25 DIAGNOSIS — Z51.81 ENCOUNTER FOR MONITORING AROMATASE INHIBITOR THERAPY: ICD-10-CM

## 2023-05-25 DIAGNOSIS — Z85.3 ENCOUNTER FOR FOLLOW-UP SURVEILLANCE OF BREAST CANCER: ICD-10-CM

## 2023-05-25 DIAGNOSIS — Z79.811 ENCOUNTER FOR MONITORING AROMATASE INHIBITOR THERAPY: ICD-10-CM

## 2023-05-25 LAB — VIT D+METAB SERPL-MCNC: 54.1 NG/ML (ref 30–100)

## 2023-05-25 PROCEDURE — 99214 OFFICE O/P EST MOD 30 MIN: CPT | Performed by: INTERNAL MEDICINE

## 2023-05-25 PROCEDURE — 82306 VITAMIN D 25 HYDROXY: CPT

## 2023-05-25 PROCEDURE — 36415 COLL VENOUS BLD VENIPUNCTURE: CPT

## 2023-05-25 RX ORDER — LETROZOLE 2.5 MG/1
2.5 TABLET, FILM COATED ORAL DAILY
Qty: 90 TABLET | Refills: 3 | Status: SHIPPED | OUTPATIENT
Start: 2023-05-25

## 2023-06-07 ENCOUNTER — OFFICE VISIT (OUTPATIENT)
Dept: SURGERY | Facility: CLINIC | Age: 73
End: 2023-06-07
Payer: COMMERCIAL

## 2023-06-07 VITALS
SYSTOLIC BLOOD PRESSURE: 174 MMHG | HEIGHT: 64 IN | OXYGEN SATURATION: 97 % | RESPIRATION RATE: 16 BRPM | BODY MASS INDEX: 29.37 KG/M2 | WEIGHT: 172 LBS | HEART RATE: 82 BPM | DIASTOLIC BLOOD PRESSURE: 92 MMHG

## 2023-06-07 DIAGNOSIS — C50.812 MALIGNANT NEOPLASM OF OVERLAPPING SITES OF LEFT BREAST IN FEMALE, ESTROGEN RECEPTOR POSITIVE (HCC): Primary | ICD-10-CM

## 2023-06-07 DIAGNOSIS — Z17.0 MALIGNANT NEOPLASM OF OVERLAPPING SITES OF LEFT BREAST IN FEMALE, ESTROGEN RECEPTOR POSITIVE (HCC): Primary | ICD-10-CM

## 2023-06-07 PROCEDURE — 3077F SYST BP >= 140 MM HG: CPT | Performed by: SURGERY

## 2023-06-07 PROCEDURE — 99213 OFFICE O/P EST LOW 20 MIN: CPT | Performed by: SURGERY

## 2023-06-07 PROCEDURE — 3008F BODY MASS INDEX DOCD: CPT | Performed by: SURGERY

## 2023-06-07 PROCEDURE — 3080F DIAST BP >= 90 MM HG: CPT | Performed by: SURGERY

## 2023-08-02 ENCOUNTER — OFFICE VISIT (OUTPATIENT)
Facility: CLINIC | Age: 73
End: 2023-08-02
Payer: COMMERCIAL

## 2023-08-02 ENCOUNTER — LAB ENCOUNTER (OUTPATIENT)
Dept: LAB | Facility: REFERENCE LAB | Age: 73
End: 2023-08-02
Attending: FAMILY MEDICINE
Payer: COMMERCIAL

## 2023-08-02 VITALS
HEIGHT: 64 IN | DIASTOLIC BLOOD PRESSURE: 84 MMHG | HEART RATE: 90 BPM | WEIGHT: 170 LBS | BODY MASS INDEX: 29.02 KG/M2 | SYSTOLIC BLOOD PRESSURE: 156 MMHG | OXYGEN SATURATION: 99 %

## 2023-08-02 DIAGNOSIS — M65.30 TRIGGER FINGER OF LEFT HAND, UNSPECIFIED FINGER: ICD-10-CM

## 2023-08-02 DIAGNOSIS — E11.9 TYPE 2 DIABETES MELLITUS WITHOUT COMPLICATION, WITHOUT LONG-TERM CURRENT USE OF INSULIN (HCC): ICD-10-CM

## 2023-08-02 DIAGNOSIS — M65.4 DE QUERVAIN'S TENOSYNOVITIS, LEFT: ICD-10-CM

## 2023-08-02 DIAGNOSIS — I10 ESSENTIAL HYPERTENSION: Primary | ICD-10-CM

## 2023-08-02 DIAGNOSIS — I97.2 POSTMASTECTOMY LYMPHEDEMA SYNDROME OF LEFT UPPER EXTREMITY: ICD-10-CM

## 2023-08-02 DIAGNOSIS — Z12.11 COLON CANCER SCREENING: ICD-10-CM

## 2023-08-02 LAB
CARTRIDGE LOT#: 603 NUMERIC
HEMOCCULT STL QL: NEGATIVE
HEMOGLOBIN A1C: 5.9 % (ref 4.3–5.6)

## 2023-08-02 PROCEDURE — 82274 ASSAY TEST FOR BLOOD FECAL: CPT | Performed by: FAMILY MEDICINE

## 2023-08-02 PROCEDURE — 99214 OFFICE O/P EST MOD 30 MIN: CPT | Performed by: FAMILY MEDICINE

## 2023-08-02 PROCEDURE — 3077F SYST BP >= 140 MM HG: CPT | Performed by: FAMILY MEDICINE

## 2023-08-02 PROCEDURE — 3044F HG A1C LEVEL LT 7.0%: CPT | Performed by: FAMILY MEDICINE

## 2023-08-02 PROCEDURE — 83036 HEMOGLOBIN GLYCOSYLATED A1C: CPT | Performed by: FAMILY MEDICINE

## 2023-08-02 PROCEDURE — 3079F DIAST BP 80-89 MM HG: CPT | Performed by: FAMILY MEDICINE

## 2023-08-02 PROCEDURE — 3008F BODY MASS INDEX DOCD: CPT | Performed by: FAMILY MEDICINE

## 2023-08-02 RX ORDER — ENALAPRIL MALEATE 10 MG/1
10 TABLET ORAL DAILY
Qty: 90 TABLET | Refills: 0 | Status: SHIPPED | OUTPATIENT
Start: 2023-08-02

## 2023-08-04 ENCOUNTER — TELEPHONE (OUTPATIENT)
Dept: PHYSICAL THERAPY | Facility: HOSPITAL | Age: 73
End: 2023-08-04

## 2023-08-08 ENCOUNTER — OFFICE VISIT (OUTPATIENT)
Dept: PHYSICAL THERAPY | Facility: HOSPITAL | Age: 73
End: 2023-08-08
Attending: FAMILY MEDICINE
Payer: COMMERCIAL

## 2023-08-08 DIAGNOSIS — I97.2 POSTMASTECTOMY LYMPHEDEMA SYNDROME OF LEFT UPPER EXTREMITY: Primary | ICD-10-CM

## 2023-08-08 PROCEDURE — 97140 MANUAL THERAPY 1/> REGIONS: CPT | Performed by: PHYSICAL THERAPIST

## 2023-08-08 PROCEDURE — 97161 PT EVAL LOW COMPLEX 20 MIN: CPT | Performed by: PHYSICAL THERAPIST

## 2023-08-14 ENCOUNTER — APPOINTMENT (OUTPATIENT)
Dept: PHYSICAL THERAPY | Facility: HOSPITAL | Age: 73
End: 2023-08-14
Attending: FAMILY MEDICINE
Payer: COMMERCIAL

## 2023-08-16 ENCOUNTER — OFFICE VISIT (OUTPATIENT)
Dept: PHYSICAL THERAPY | Facility: HOSPITAL | Age: 73
End: 2023-08-16
Attending: FAMILY MEDICINE
Payer: COMMERCIAL

## 2023-08-16 PROCEDURE — 97140 MANUAL THERAPY 1/> REGIONS: CPT | Performed by: PHYSICAL THERAPIST

## 2023-08-23 ENCOUNTER — OFFICE VISIT (OUTPATIENT)
Dept: PHYSICAL THERAPY | Facility: HOSPITAL | Age: 73
End: 2023-08-23
Attending: FAMILY MEDICINE
Payer: COMMERCIAL

## 2023-08-23 PROCEDURE — 97140 MANUAL THERAPY 1/> REGIONS: CPT

## 2023-09-07 ENCOUNTER — OFFICE VISIT (OUTPATIENT)
Dept: PHYSICAL THERAPY | Facility: HOSPITAL | Age: 73
End: 2023-09-07
Attending: FAMILY MEDICINE
Payer: COMMERCIAL

## 2023-09-07 PROCEDURE — 97140 MANUAL THERAPY 1/> REGIONS: CPT

## 2023-11-07 ENCOUNTER — HOSPITAL ENCOUNTER (OUTPATIENT)
Dept: ULTRASOUND IMAGING | Facility: HOSPITAL | Age: 73
Discharge: HOME | End: 2023-11-07
Attending: INTERNAL MEDICINE
Payer: COMMERCIAL

## 2023-11-07 ENCOUNTER — HOSPITAL ENCOUNTER (OUTPATIENT)
Dept: MAMMOGRAPHY | Facility: HOSPITAL | Age: 73
Discharge: HOME | End: 2023-11-07
Attending: INTERNAL MEDICINE
Payer: COMMERCIAL

## 2023-11-07 ENCOUNTER — TELEPHONE (OUTPATIENT)
Facility: CLINIC | Age: 73
End: 2023-11-07

## 2023-11-07 DIAGNOSIS — Z17.0 MALIGNANT NEOPLASM OF OVERLAPPING SITES OF LEFT BREAST IN FEMALE, ESTROGEN RECEPTOR POSITIVE: ICD-10-CM

## 2023-11-07 DIAGNOSIS — C50.812 MALIGNANT NEOPLASM OF OVERLAPPING SITES OF LEFT BREAST IN FEMALE, ESTROGEN RECEPTOR POSITIVE: ICD-10-CM

## 2023-11-07 PROCEDURE — 77062 BREAST TOMOSYNTHESIS BI: CPT | Performed by: INTERNAL MEDICINE

## 2023-11-07 PROCEDURE — 76642 ULTRASOUND BREAST LIMITED: CPT | Performed by: INTERNAL MEDICINE

## 2023-11-07 PROCEDURE — 77066 DX MAMMO INCL CAD BI: CPT | Performed by: INTERNAL MEDICINE

## 2023-11-07 RX ORDER — ENALAPRIL MALEATE 10 MG/1
10 TABLET ORAL DAILY
Qty: 90 TABLET | Refills: 0 | Status: SHIPPED | OUTPATIENT
Start: 2023-11-07

## 2023-11-07 NOTE — TELEPHONE ENCOUNTER
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 She will try to figure out if she has some time soon and give us a call back.

## 2023-11-07 NOTE — TELEPHONE ENCOUNTER
Please review refill failed/no protocol     Requested Prescriptions     Pending Prescriptions Disp Refills    ENALAPRIL 10 MG Oral Tab [Pharmacy Med Name: ENALAPRIL MALEATE 10 MG TAB] 90 tablet 0     Sig: TAKE 1 TABLET BY MOUTH EVERY DAY         Recent Visits  Date Type Provider Dept   08/02/23 Office Visit Shea Meadows, DO [address] [facility]   11/01/22 Office Visit Shea Meadows, DO [facility]   10/20/22 Office Visit Shea Meadows, DO [facility]   06/23/22 Office Visit Shea Meadows, DO [facility]   Showing recent visits within past 540 days with a meds authorizing provider and meeting all other requirements  Future Appointments  No visits were found meeting these conditions. Showing future appointments within next 150 days with a meds authorizing provider and meeting all other requirements    Requested Prescriptions   Pending Prescriptions Disp Refills    ENALAPRIL 10 MG Oral Tab [Pharmacy Med Name: ENALAPRIL MALEATE 10 MG TAB] 90 tablet 0     Sig: TAKE 1 TABLET BY MOUTH EVERY DAY       Hypertensive Medications Protocol Failed - 11/7/2023 12:31 AM        Failed - Last BP reading less than 140/90     BP Readings from Last 1 Encounters:   08/02/23 156/84               Failed - CMP or BMP in past 6 months     No results found for this or any previous visit (from the past 4392 hour(s)).             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    [facility] [place], [place]    Office Visit    3 months ago [facility] [place]    Office Visit                      Passed - In person appointment or virtual visit in the past 6 months     Recent Outpatient Visits              2 months ago     [address] [place], [place]    Office Visit    2 months ago     [address] [place], [place]    Office Visit    2 months ago     [address] [facility], [address] [place] [address] Visit    3 months ago 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 Jesi [place], [place]    Office Visit    3 months ago Essential hypertension    [address], [place], [place]    Office Visit

## 2023-11-14 DIAGNOSIS — C50.812 MALIGNANT NEOPLASM OF OVERLAPPING SITES OF LEFT BREAST IN FEMALE, ESTROGEN RECEPTOR POSITIVE: Primary | ICD-10-CM

## 2023-11-14 DIAGNOSIS — Z17.0 MALIGNANT NEOPLASM OF OVERLAPPING SITES OF LEFT BREAST IN FEMALE, ESTROGEN RECEPTOR POSITIVE: Primary | ICD-10-CM

## 2023-11-21 RX ORDER — METOPROLOL SUCCINATE 25 MG/1
25 TABLET, EXTENDED RELEASE ORAL DAILY
Qty: 90 TABLET | Refills: 2 | Status: SHIPPED | OUTPATIENT
Start: 2023-11-21

## 2023-11-21 NOTE — TELEPHONE ENCOUNTER
Refill passed per [facility] protocol. Requested Prescriptions   Pending Prescriptions Disp Refills    METFORMIN HCL 1000 MG Oral Tab [Pharmacy Med Name: METFORMIN HCL 1,000 MG TABLET] 180 tablet 1     Sig: TAKE 1 TABLET BY MOUTH TWICE A DAY WITH MEALS       Diabetes Medication Protocol Failed - 11/20/2023 12:33 AM        Failed - EGFRCR or GFRNAA > 50     GFR Evaluation            Failed - GFR in the past 12 months        Passed - Last A1C < 7.5 and within past 6 months     Lab Results   Component Value Date    A1C 5.9 (A) 08/02/2023             Passed - In person appointment or virtual visit in the past 6 mos or appointment in next 3 mos     Recent Outpatient Visits              2 months ago     [address] [place], [place]    Office Visit    3 months ago     [address] [place], [place]    Office Visit    3 months ago     [address] [place], [place]    Office Visit    3 months ago 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 Hypertensive Medications Protocol Failed - 11/20/2023 12:33 AM        Failed - Last BP reading less than 140/90     BP Readings from Last 1 Encounters:   08/02/23 156/84               Failed - CMP or BMP in past 6 months     No results found for this or any previous visit (from the past 4392 hour(s)).             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    [facility], [place]    Office Visit    3 months ago Essential hypertension    [address], [address], [place], [facility] - In person appointment or virtual visit in the past 6 months     Recent Outpatient Visits              2 months ago     [address] [place], [place]    Office Visit    3 months ago     [address] [place], [place]    Office Visit    3 months ago     [address] [place], [place]    Office Visit    3 months ago 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    [facility], [place]    Office Visit    3 months ago Essential hypertension    [address], [address], [place], [facility] Visit                           Recent Outpatient Visits              2 months ago     [address] [place], [place]    Office Visit    3 months ago     [address] [place], [place]    Office Visit    3 months ago     [address] [place], [place]    Office Visit    3 months ago 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

## 2023-12-12 RX ORDER — LEVOTHYROXINE SODIUM 112 MCG
112 TABLET ORAL EVERY MORNING
Qty: 90 TABLET | Refills: 3 | OUTPATIENT
Start: 2023-12-12

## 2023-12-27 ENCOUNTER — MED REC SCAN ONLY (OUTPATIENT)
Facility: CLINIC | Age: 73
End: 2023-12-27

## 2024-02-05 RX ORDER — ENALAPRIL MALEATE 10 MG/1
10 TABLET ORAL DAILY
Qty: 90 TABLET | Refills: 1 | Status: SHIPPED | OUTPATIENT
Start: 2024-02-05

## 2024-02-05 NOTE — TELEPHONE ENCOUNTER
Please review; protocol failed/No Protocol    Sent [employer] message to schedule an appointment    No Active/Future labs pended     Requested Prescriptions   Pending Prescriptions Disp Refills    ENALAPRIL 10 MG Oral Tab [Pharmacy Med Name: ENALAPRIL MALEATE 10 MG TAB] 90 tablet 0     Sig: TAKE 1 TABLET BY MOUTH EVERY DAY       Hypertensive Medications Protocol Failed - 2/3/2024  7:05 AM        Failed - Last BP reading less than 140/90     BP Readings from Last 1 Encounters:   08/02/23 156/84               Failed - CMP or BMP in past 6 months     No results found for this or any previous visit (from the past 4392 hour(s)).            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    [facility]ena[facility]    Office Visit    6 months ago Essential hypertension    [facility]    Office Visit                      Failed - EGFRCR or GFRNAA > 50     GFR Evaluation            Passed - In person appointment in the past 12 or next 3 months     Recent Outpatient Visits              5 months ago     [facility] Analia Bejarano, PTA    Office Visit    5 months ago     [facility] Analia Bejarano, HERBIE    Office Visit    5 months ago     [facility] Jen Pena, PT    Office Visit    6 months ago 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

## 2024-03-18 ENCOUNTER — NURSE TRIAGE (OUTPATIENT)
Facility: CLINIC | Age: 74
End: 2024-03-18

## 2024-03-18 ENCOUNTER — OFFICE VISIT (OUTPATIENT)
Facility: CLINIC | Age: 74
End: 2024-03-18
Payer: COMMERCIAL

## 2024-03-18 ENCOUNTER — HOSPITAL ENCOUNTER (OUTPATIENT)
Dept: GENERAL RADIOLOGY | Age: 74
Discharge: HOME | End: 2024-03-18
Attending: FAMILY MEDICINE
Payer: COMMERCIAL

## 2024-03-18 VITALS
WEIGHT: 176.13 LBS | DIASTOLIC BLOOD PRESSURE: 84 MMHG | BODY MASS INDEX: 30 KG/M2 | HEART RATE: 89 BPM | OXYGEN SATURATION: 96 % | SYSTOLIC BLOOD PRESSURE: 142 MMHG

## 2024-03-18 DIAGNOSIS — K64.9 HEMORRHOIDS, UNSPECIFIED HEMORRHOID TYPE: ICD-10-CM

## 2024-03-18 DIAGNOSIS — Z23 NEED FOR VACCINATION: ICD-10-CM

## 2024-03-18 DIAGNOSIS — I10 ESSENTIAL HYPERTENSION: ICD-10-CM

## 2024-03-18 DIAGNOSIS — M25.812 MASS OF JOINT OF LEFT SHOULDER: ICD-10-CM

## 2024-03-18 DIAGNOSIS — E11.9 TYPE 2 DIABETES MELLITUS WITHOUT COMPLICATION, WITHOUT LONG-TERM CURRENT USE OF INSULIN (HCC): Primary | ICD-10-CM

## 2024-03-18 PROCEDURE — 73030 X-RAY EXAM OF SHOULDER: CPT | Performed by: FAMILY MEDICINE

## 2024-03-18 PROCEDURE — 90471 IMMUNIZATION ADMIN: CPT | Performed by: FAMILY MEDICINE

## 2024-03-18 PROCEDURE — 3079F DIAST BP 80-89 MM HG: CPT | Performed by: FAMILY MEDICINE

## 2024-03-18 PROCEDURE — 3077F SYST BP >= 140 MM HG: CPT | Performed by: FAMILY MEDICINE

## 2024-03-18 PROCEDURE — 99214 OFFICE O/P EST MOD 30 MIN: CPT | Performed by: FAMILY MEDICINE

## 2024-03-18 PROCEDURE — 90662 IIV NO PRSV INCREASED AG IM: CPT | Performed by: FAMILY MEDICINE

## 2024-03-18 RX ORDER — HYDROCORTISONE 25 MG/G
1 CREAM TOPICAL 2 TIMES DAILY
Qty: 28 G | Refills: 0 | Status: SHIPPED | OUTPATIENT
Start: 2024-03-18 | End: 2024-03-25

## 2024-03-18 NOTE — PROGRESS NOTES
Subjective:   Cynthia Larson is a 73 year old female who presents for Blood In Stool (Patient complains of having blood in stool x twice in one week. )     Blood in stool  Painless. No clots. Has had two episodes of bright red blood per rectum. Noted blood sitting on top of stool. No melena. No blood in the bowl. Did have colonoscopy that was normal within the last 10 years that was normal. Had FIT test 8/2023 that was normal. No unintentional weight loss.     DM  Is taking 1000 mg once daily. Last A1c 6.1 in 2022. Has not had eye exam yet.    Left shoulder lump  First noticed about 1 month ago. Lifts heavy things for work. Tender to touch. No notable change in size. No impairment in lifting. No prior trauma to shoulder    HTN  Took medications today.       History/Other:    Chief Complaint Reviewed and Verified  Nursing Notes Reviewed and   Verified  Tobacco Reviewed  Allergies Reviewed  Medications Reviewed    Problem List Reviewed  Medical History Reviewed  Surgical History   Reviewed  Family History Reviewed  Social History Reviewed         Tobacco:  She smoked tobacco in the past but quit greater than 12 months ago.  Social History    Tobacco Use      Smoking status: Former        Types: Cigarettes      Smokeless tobacco: Never      Tobacco comments: pt quit about 50 yrs ago        Current Outpatient Medications   Medication Sig Dispense Refill    hydrocortisone (ANUSOL-HC) 2.5 % External Cream Place 1 Application rectally 2 (two) times daily for 7 days. 28 g 0    enalapril 10 MG Oral Tab Take 1 tablet (10 mg total) by mouth daily. 90 tablet 1    metFORMIN HCl 1000 MG Oral Tab Take 1 tablet (1,000 mg total) by mouth 2 (two) times daily with meals. 180 tablet 2    metoprolol succinate ER 25 MG Oral Tablet 24 Hr Take 1 tablet (25 mg total) by mouth daily. 90 tablet 2    letrozole 2.5 MG Oral Tab Take 1 tablet (2.5 mg total) by mouth daily. 90 tablet 3    Pyridoxine HCl (VITAMIN B-6 OR) Take 10 mg by  mouth daily.      Multiple Minerals-Vitamins (CALCIUM-MAGNESIUM-ZINC-D3 OR) Take by mouth daily. Calcium 500mg/Magnesium 80mg/Zinc 10mg/Vit D3 20mg      NON FORMULARY OSTEO BI-FLEX PO      LEVOTHYROXINE 112 MCG Oral Tab 1 tablet (112 mcg total).           Review of Systems:  Review of Systems   Constitutional: Negative.    Respiratory: Negative.     Cardiovascular: Negative.    Gastrointestinal:  Positive for blood in stool.   Musculoskeletal:         + left shoulder mass   Skin: Negative.    Neurological: Negative.          Objective:   /84 (BP Location: Left arm, Patient Position: Sitting, Cuff Size: adult)   Pulse 89   Wt 176 lb 2 oz (79.9 kg)   SpO2 96%   BMI 30.23 kg/m²  Estimated body mass index is 30.23 kg/m² as calculated from the following:    Height as of 8/2/23: 5' 4\" (1.626 m).    Weight as of this encounter: 176 lb 2 oz (79.9 kg).    BP Readings from Last 4 Encounters:   03/18/24 142/84   08/02/23 156/84   06/07/23 (!) 174/92   05/25/23 (!) 177/71       Physical Exam  Vitals and nursing note reviewed.   Constitutional:       General: She is not in acute distress.     Appearance: Normal appearance.   HENT:      Head: Normocephalic and atraumatic.   Eyes:      General:         Right eye: No discharge.         Left eye: No discharge.      Comments: Extraocular eye movements grossly intact   Pulmonary:      Effort: Pulmonary effort is normal.   Genitourinary:     Rectum: External hemorrhoid (nonbleeding at 12 o'clock) present.   Musculoskeletal:      Comments: Normal gait    Left shoulder: noted hard immobile nodule on left shoulder. Less than 1 cm in size   Skin:     Findings: No rash.   Neurological:      General: No focal deficit present.      Mental Status: She is alert. Mental status is at baseline.   Psychiatric:         Mood and Affect: Mood normal.         Behavior: Behavior normal.         Assessment & Plan:   1. Type 2 diabetes mellitus without complication, without long-term current use  of insulin (HCC) (Primary)  -     Comp Metabolic Panel (14); Future; Expected date: 03/18/2024  -     Microalb/Creat Ratio, Random Urine; Future; Expected date: 03/18/2024  -     Hemoglobin A1C; Future; Expected date: 03/18/2024  -     Diabetic Retinopathy Exam; Future; Expected date: 03/18/2024    -ordered labs. Patient aware to return for labs as lab was closed during patient visit.     2. Need for vaccination  -     High Dose Fluzone 65 yr and older PFS [05920]    Administered by staff    3. Mass of joint of left shoulder  -     XR SHOULDER, COMPLETE (MIN 2 VIEWS), LEFT (CPT=73030); Future; Expected date: 03/18/2024    -not impairing patient's range of motion. Ordere xray. Management dependent on imaging results. If simple spur patient may opt to monitor or will place referral to ortho     4. Essential hypertension  -BP remains elevated above goal of less than 140/90 despite provider recheck. Recommended get home BP arm cuff and check blood pressures at home      5. Hemorrhoids, unspecified hemorrhoid type  -     Hydrocortisone (Perianal); Place 1 Application rectally 2 (two) times daily for 7 days.  Dispense: 28 g; Refill: 0    -noted external hemorrhoid. Will trial anusol cream as above.   -if bleeding persists will refer to GI        Return in about 4 weeks (around 4/15/2024) for HTN.    Sara Loera MD, 3/18/2024, 4:42 PM

## 2024-03-18 NOTE — TELEPHONE ENCOUNTER
Action Requested: Summary for Provider     []  Critical Lab, Recommendations Needed  [] Need Additional Advice  []   FYI    []   Need Orders  [] Need Medications Sent to Pharmacy  []  Other     SUMMARY: Patient reports noticeable blood around stool for the past couple of days.   Denies passing any blood clots, diarrhea, abdominal pain, dizziness  Appt made with available provider.   Patient advised if symptoms worsen to go to IC/ED. Patient verbalized understanding.       Future Appointments   Date Time Provider Department Center   3/18/2024  4:30 PM Sara Loera MD [facility]         Reason for call: Blood In Stool  Onset: Few days ago          Reason for Disposition   Patient wants to be seen    Protocols used: Rectal Bleeding-A-OH

## 2024-03-18 NOTE — PATIENT INSTRUCTIONS
Don't forget to come back fasted for your labs, 8 hours nothing to eat or drink except water. Lab hours are from 7:30 a.m. -4:00 p.m. Monday through Friday.     Also remember to get your diabetic eye exam done.    Try the anusol cream twice daily for 7 days. Make sure you are drinking 64 oz of water a day and eating 30 grams of fiber daily in your diet.     Come back next month for your blood pressure check

## 2024-05-21 RX ORDER — ENALAPRIL MALEATE 10 MG/1
10 TABLET ORAL DAILY
Qty: 90 TABLET | Refills: 1 | OUTPATIENT
Start: 2024-05-21

## 2024-05-23 ENCOUNTER — HOSPITAL ENCOUNTER (OUTPATIENT)
Dept: ULTRASOUND IMAGING | Facility: HOSPITAL | Age: 74
Discharge: HOME | End: 2024-05-23
Attending: INTERNAL MEDICINE

## 2024-05-23 ENCOUNTER — HOSPITAL ENCOUNTER (OUTPATIENT)
Dept: MAMMOGRAPHY | Facility: HOSPITAL | Age: 74
Discharge: HOME | End: 2024-05-23
Attending: INTERNAL MEDICINE

## 2024-05-23 DIAGNOSIS — Z17.0 MALIGNANT NEOPLASM OF OVERLAPPING SITES OF LEFT BREAST IN FEMALE, ESTROGEN RECEPTOR POSITIVE (HCC): ICD-10-CM

## 2024-05-23 DIAGNOSIS — C50.812 MALIGNANT NEOPLASM OF OVERLAPPING SITES OF LEFT BREAST IN FEMALE, ESTROGEN RECEPTOR POSITIVE (HCC): ICD-10-CM

## 2024-05-23 PROCEDURE — 76642 ULTRASOUND BREAST LIMITED: CPT | Performed by: INTERNAL MEDICINE

## 2024-05-23 PROCEDURE — 77061 BREAST TOMOSYNTHESIS UNI: CPT | Performed by: INTERNAL MEDICINE

## 2024-05-23 PROCEDURE — 77065 DX MAMMO INCL CAD UNI: CPT | Performed by: INTERNAL MEDICINE

## 2024-05-23 NOTE — IMAGING NOTE
Discussed recommended breast biopsy with patient.  Patient is being recommended for stereotactic biopsy of the left   Breast  by Dr. Tucker . Pt has 3 dtrs  works ft at [employer] will need to  look at schedule .  1127 I called pt pt wanted Friday 5/31 off from work. Was provided Friday 5/31 checking in at 815 am [facility].  Pt history discussed as below:  Pt history of biopsy: Yes 2022 left breast ca        Family history of cancer: mom colon dx age 60's  Pt history of breast cancer: yes left breast ca dx   RECOMMENDATIONS:   STEREOTACTIC-GUIDED CORE BIOPSY: LEFT BREAST    Discussed with patient Radiology’s protocol regarding medications, as well as over-the-counter vitamin or herbal supplements, as follows:  -All herbal supplements, Vitamin E, Fish Oil    -All NSAIDs (Ibuprofen, Motrin, Advil, Aleve, or other antiinflammatory medication)  and Aspirin  81mg currently being taken    not  recommended or prescribed by  your physician  should be held for 5  days prior to biopsy.  Denies usage   -Aspirin 81 mg being taken related to a cardiac condition  or prescribed by your  physician should be held at the  direction of your physician.  Informed patient to call ordering physician for guidelines denies usage    -Blood thinners/antiplatelet medications (Coumadin, Plavix  ect) should be held at the  direction of your physician.  Informed patient to call ordering physician for guidelines denies usage   Reviewed Stereotactic biopsy procedure, as below.  For this procedure,   stereotactic biopsy is being performed with tomosynthesis , you will sitting upright  with your breast in compression.  Mammography imaging will be used to locate the area in question that was seen on your previous breast imaging.  The Radiologist will then inject a local numbing medication into the area and then use a needle to collect cells from the site.  A marker, or clip, will then be placed in the biopsied area.  This marker is placed  so this biopsy site is able to be accurately located upon future breast imaging.  After the clip is placed,  a dressing will be placed on the biopsy site.  Additional mammography films will then be taken to assure correct placement of the marker.    Educated the patient they will be awake during this procedure and are able to drive themselves home if they wish.    Educated patient that some soreness may occur after biopsy.  Discussed use of a supportive bra and ice packs after procedure, to decrease soreness.    Discussed with patient no swimming, bathing,  hot tubs , or submerging underwater for 5 days and   until the incision is closed and healed.  Educated patient on lifting restrictions - nothing heavier than a gallon of milk for  48 hours after the procedure.      Discussed with patient that some soreness and bruising is normal after biopsy but that prolonged or increased pain and bruising should be reported to the ordering physician.  An ace wrap will be applied over bra for added support and should be left on for 24 hours post procedure.  Reviewed results process with patient and shared that pathology results will be available within 2-3 business days of their biopsy.  Discussed results will be communicated by their ordering physician unless otherwise indicated.  Educated patient that once we receive an order from their physician our radiology secretaries will be calling them to schedule their biopsy.

## 2024-05-24 NOTE — DISCHARGE INSTRUCTIONS
The Doctor (Radiologist) who performed your procedure was: DR ROA    Place an ice pack over the biopsy site on top of your bra or on top of the ACE wrap (never apply ice directly over skin) for 10-15 minutes of every hour until bedtime for your comfort and to decrease bleeding.  Keep your sports bra or the ACE wrap (stereotactic and MRI biopsy) in place for 24 hours after your biopsy. This compression decreases bleeding and breast movement for your comfort. Wear a supportive bra for the next couple of days for comfort (sports bra for sleep).   Continue to wear, preferably, a sports bra or good supportive bra for 1 week and take off only to shower.  No baths or showers during the first 24 hours after biopsy. After this time you may take a shower. It's okay if the strips get wet but do not soak them. NO saunas, hot tubs or swimming until steri-strips fall off (approx. 5 days). This prevents infection and allows time for them to completely close and heal.  DO NOT remove the steri-strips. They will fall off in 5 days. If any type of irritation (redness, itching or blisters) develops in the area around the steri-strips, remove them gently. If the steri-strips do not fall off after 5 days, gently remove them. Keep the area clean and dry.  It is normal to have mild discomfort and bruising at the biopsy site.  You may take Tylenol as needed for discomfort, as long as you have no allergies to Tylenol. Do not take aspirin, motrin, ibuprofen or any medication containing NSAID (non-steroidal anti-inflammatory drug) product for 48 hours.  DO NOT participate in strenuous activity (aerobics, heavy lifting, housework, gardening, etc.) 48 hours after your biopsy to prevent bleeding.  You will receive results in 2-3 business days.  If you are having an MRI breast biopsy or an Ultrasound guided breast biopsy, you will be billed for the biopsy and unilateral mammogram separately.  If you have any questions about the procedure or  your results, please contact the Breast Care Coordinator Nurse at [phone].  Notify your ordering physician or primary physician for increased bleeding, pain or fever over 100. Or contact a Radiology Nurse at [phone] between 8am-4pm (after 4pm, your call will be directed to the [facility]).

## 2024-05-29 ENCOUNTER — APPOINTMENT (OUTPATIENT)
Dept: HEMATOLOGY/ONCOLOGY | Facility: HOSPITAL | Age: 74
End: 2024-05-29
Attending: INTERNAL MEDICINE
Payer: COMMERCIAL

## 2024-05-31 ENCOUNTER — HOSPITAL ENCOUNTER (OUTPATIENT)
Dept: MAMMOGRAPHY | Facility: HOSPITAL | Age: 74
Discharge: HOME | End: 2024-05-31
Attending: INTERNAL MEDICINE
Payer: COMMERCIAL

## 2024-05-31 DIAGNOSIS — N64.89 BREAST ASYMMETRY: ICD-10-CM

## 2024-05-31 PROCEDURE — 88305 TISSUE EXAM BY PATHOLOGIST: CPT | Performed by: INTERNAL MEDICINE

## 2024-05-31 PROCEDURE — 19081 BX BREAST 1ST LESION STRTCTC: CPT | Performed by: INTERNAL MEDICINE

## 2024-05-31 PROCEDURE — 88342 IMHCHEM/IMCYTCHM 1ST ANTB: CPT | Performed by: INTERNAL MEDICINE

## 2024-06-03 ENCOUNTER — TELEPHONE (OUTPATIENT)
Dept: MAMMOGRAPHY | Facility: HOSPITAL | Age: 74
End: 2024-06-03

## 2024-06-03 NOTE — TELEPHONE ENCOUNTER
Follow up call post Bx no answer I left a detail message to return call to  with any questions or concerns post bx

## 2024-06-04 ENCOUNTER — TELEPHONE (OUTPATIENT)
Dept: MAMMOGRAPHY | Facility: HOSPITAL | Age: 74
End: 2024-06-04

## 2024-06-04 NOTE — TELEPHONE ENCOUNTER
Cynthia Larson is s/p biopsy .  Phoned and introduced myself as breast coordinator .  Reinforced to patient  post biopsy care and instructions . C/o bruising at bottom of breast 4\" long 1 1/2 \" wide pt did message breast noticed increased in size instructed not to massage breast that bruising will decrease with time. Denies lump in left breast .Pt has follow up appt  with Dr Hall on Thursday.   Informed  and shared the pathology results as well as the recommendations from Dr Tucker/ for her breast imaging  as follows:      Pathology results shared (see epic for dictated pathology and radiology procedure report)  and recommendations are as follows:          RECOMMENDATION:   Results are benign and concordant.  Patient may return to annual screening mammogram.            Cynthia Larsonacknowledges the above and denies questions. Cynthia Larson was also instructed to perform breast self exams and if any changes  develops any changes to contact ordering  physician immediately  for re evaluation..  Cynthia Larsonverbalizes understanding and agreement.

## 2024-06-06 ENCOUNTER — OFFICE VISIT (OUTPATIENT)
Dept: HEMATOLOGY/ONCOLOGY | Facility: HOSPITAL | Age: 74
End: 2024-06-06
Attending: INTERNAL MEDICINE
Payer: COMMERCIAL

## 2024-06-06 VITALS
RESPIRATION RATE: 18 BRPM | HEART RATE: 79 BPM | TEMPERATURE: 98 F | WEIGHT: 172.63 LBS | OXYGEN SATURATION: 98 % | HEIGHT: 64 IN | SYSTOLIC BLOOD PRESSURE: 161 MMHG | DIASTOLIC BLOOD PRESSURE: 77 MMHG | BODY MASS INDEX: 29.47 KG/M2

## 2024-06-06 DIAGNOSIS — Z51.81 ENCOUNTER FOR MONITORING AROMATASE INHIBITOR THERAPY: ICD-10-CM

## 2024-06-06 DIAGNOSIS — L76.82 AXILLARY WEB SYNDROME: ICD-10-CM

## 2024-06-06 DIAGNOSIS — Z17.0 MALIGNANT NEOPLASM OF OVERLAPPING SITES OF LEFT BREAST IN FEMALE, ESTROGEN RECEPTOR POSITIVE (HCC): Primary | ICD-10-CM

## 2024-06-06 DIAGNOSIS — Z78.0 OSTEOPENIA AFTER MENOPAUSE: ICD-10-CM

## 2024-06-06 DIAGNOSIS — Z85.3 ENCOUNTER FOR FOLLOW-UP SURVEILLANCE OF BREAST CANCER: ICD-10-CM

## 2024-06-06 DIAGNOSIS — M85.80 OSTEOPENIA AFTER MENOPAUSE: ICD-10-CM

## 2024-06-06 DIAGNOSIS — Z08 ENCOUNTER FOR FOLLOW-UP SURVEILLANCE OF BREAST CANCER: ICD-10-CM

## 2024-06-06 DIAGNOSIS — Z79.811 ENCOUNTER FOR MONITORING AROMATASE INHIBITOR THERAPY: ICD-10-CM

## 2024-06-06 DIAGNOSIS — C50.812 MALIGNANT NEOPLASM OF OVERLAPPING SITES OF LEFT BREAST IN FEMALE, ESTROGEN RECEPTOR POSITIVE (HCC): Primary | ICD-10-CM

## 2024-06-06 DIAGNOSIS — L90.5 AXILLARY WEB SYNDROME: ICD-10-CM

## 2024-06-06 PROCEDURE — 99214 OFFICE O/P EST MOD 30 MIN: CPT | Performed by: INTERNAL MEDICINE

## 2024-06-06 RX ORDER — LETROZOLE 2.5 MG/1
2.5 TABLET, FILM COATED ORAL DAILY
Qty: 90 TABLET | Refills: 3 | Status: SHIPPED | OUTPATIENT
Start: 2024-06-06

## 2024-06-06 NOTE — PROGRESS NOTES
HPI   Cynthia Larson is a 73 year old female here for follow up of   Encounter Diagnoses   Name Primary?    Malignant neoplasm of overlapping sites of left breast in female, estrogen receptor positive (HCC) Yes    Encounter for monitoring aromatase inhibitor therapy     Encounter for follow-up surveillance of breast cancer     Osteopenia after menopause     Axillary web syndrome         Compliance with SBE: Yes. Changes on SBE: No.     Compliance with letrozole:  compliance all of the time    Side effects from letrozole: No hot flashes, No arthralgias or myalgias.      Fingers are also getting stuck.    Completed PT and most cording resolved.      She is taking vitamin D and calcium.    Patient had diagnostic left breast mammogram on 5/23/2024 which showed asymmetry of the posterior lateral left breast for which biopsy was recommended.  Biopsy was performed on 5/31/2024, biopsy was consistent with benign breast tissue with benign intramammary lymph node.  These results were concordant and benign, she was recommended for annual screening mammogram.    ECOG PS 0    Review of Systems:   Review of Systems   Constitutional:  Negative for appetite change, fatigue and unexpected weight change.   Respiratory:  Positive for cough (from allergies). Negative for shortness of breath.    Cardiovascular:  Negative for chest pain.   Gastrointestinal:  Negative for abdominal pain.   Endocrine: Negative for hot flashes.   Musculoskeletal:  Positive for arthralgias (hands). Negative for back pain and neck pain.   Neurological:  Negative for dizziness and headaches.   Hematological:  Negative for adenopathy.   Psychiatric/Behavioral:  Negative for sleep disturbance (it is OK).             Current Outpatient Medications   Medication Sig Dispense Refill    Cholecalciferol (VITAMIN D3) 25 MCG (1000 UT) Oral Cap Take 1 tablet by mouth daily.      enalapril 10 MG Oral Tab Take 1 tablet (10 mg total) by mouth daily. 90 tablet 1     metFORMIN HCl 1000 MG Oral Tab Take 1 tablet (1,000 mg total) by mouth 2 (two) times daily with meals. 180 tablet 2    metoprolol succinate ER 25 MG Oral Tablet 24 Hr Take 1 tablet (25 mg total) by mouth daily. 90 tablet 2    letrozole 2.5 MG Oral Tab Take 1 tablet (2.5 mg total) by mouth daily. 90 tablet 3    Multiple Minerals-Vitamins (CALCIUM-MAGNESIUM-ZINC-D3 OR) Take by mouth daily. Calcium 500mg/Magnesium 80mg/Zinc 10mg/Vit D3 20mg      NON FORMULARY OSTEO BI-FLEX PO      LEVOTHYROXINE 112 MCG Oral Tab 1 tablet (112 mcg total).       Allergies:   Allergies   Allergen Reactions    Antihistamine & Nasal Deconges [Fexofenadine-Pseudoephedrine] OTHER (SEE COMMENTS)     Elevated BP    Sudafed [Pseudoephedrine] OTHER (SEE COMMENTS)     Elevated BP    Seasonal Coughing     Runny nose, Headache       Past Medical History:    Anesthesia complication    In  patient went into cardiac arrest after receiving the following anesthesia agents: Fentanyl, Propofol, Vecuronium, Remifentanil    Cancer (HCC)    Thyroid cancer     COVID    Sore throat cough headache, body aches. Nofever or residual    COVID-19 virus infection    Hyperlipidemia    Hypertension    Hypothyroidism    Prediabetes    Type 2 diabetes mellitus (HCC)     Past Surgical History:   Procedure Laterality Date    Anesth,surgery of shoulder Right 2016    Torn rotator cuff          Lillie biopsy stereo nodule 1 site left (cpt=19081)      X-CLIP    Thyroidectomy       Social History     Socioeconomic History    Marital status:    Tobacco Use    Smoking status: Former     Types: Cigarettes    Smokeless tobacco: Never    Tobacco comments:     pt quit about 50 yrs ago    Vaping Use    Vaping status: Never Used   Substance and Sexual Activity    Alcohol use: Not Currently     Comment: occ holidays    Drug use: Never    Sexual activity: Not Currently     Partners: Male     Social Determinants of Health      Received from [facility]  [place], [facility]    Social Connections    Received from [facility], [facility]    Housing Stability       Family History   Problem Relation Age of Onset    Breast Cancer Self     Colon Cancer Mother     Hypertension Mother     Diabetes Mother         type 2    Hypertension Father     Colon Cancer Maternal Aunt          PHYSICAL EXAM:    BP (!) 161/77 (BP Location: Right arm, Patient Position: Sitting, Cuff Size: adult)   Pulse 79   Temp 97.8 °F (36.6 °C)   Resp 18   Ht 1.626 m (5' 4\")   Wt 78.3 kg (172 lb 9.6 oz)   SpO2 98%   BMI 29.63 kg/m²   Wt Readings from Last 6 Encounters:   06/06/24 78.3 kg (172 lb 9.6 oz)   03/18/24 79.9 kg (176 lb 2 oz)   08/02/23 77.1 kg (170 lb)   06/07/23 78 kg (172 lb)   05/25/23 78 kg (172 lb)   12/07/22 81.2 kg (179 lb)     Physical Exam  General: Patient is alert, not in acute distress.  HEENT: EOMs intact. PERRL.  Neck: No JVD. No palpable lymphadenopathy. Neck is supple.  Chest: Clear to auscultation.  Breasts:  R breast no masses, L breast with post operative changes and no masses. Ecchymosis from biopsy.  Heart: Regular rate and rhythm.   Abdomen: Soft, non tender with good bowel sounds.  Extremities: No edema.  Neurological: Grossly intact.   Lymphatics: There is no palpable lymphadenopathy throughout in the cervical, supraclavicular, axillary, or inguinal regions.  Psych/Depression: nl        ASSESSMENT/PLAN:     1. Malignant neoplasm of overlapping sites of left breast in female, estrogen receptor positive (HCC)    2. Encounter for monitoring aromatase inhibitor therapy    3. Encounter for follow-up surveillance of breast cancer    4. Osteopenia after menopause    5. Axillary web syndrome       Cancer Staging   Malignant neoplasm of overlapping sites of left breast in female, estrogen receptor positive (HCC)  Staging form: Breast, AJCC 8th Edition  - Clinical stage from 10/26/2022: Stage IA (cT1b, cN0, cM0,  G1, ER+, CO+, HER2-) - Signed by Herber Hall MD on 10/26/2022  - Pathologic stage from 11/21/2022: Stage IA (pT1b, pN0(sn), cM0, G1, ER+, CO+, HER2-) - Signed by Herber Hall MD on 11/21/2022      Discussed with the patient the natural history of breast cancer.     Given her early stage, and her tumor biology, her prognosis is good.  Pathologic staging is the same as her clinical staging.    She has status post breast conservation with a lumpectomy.      Given her age being over 70, her tumor be strongly positive for estrogen and progesterone, and on imaging her primary tumor be less than 1 cm, she may not need radiation to her breast to complete local regional management, as long as she is compliant with at least 5 years of adjuvant hormonal therapy with an aromatase inhibitor.    Patient will complete 5 years of adjuvant hormonal therapy with letrozole and December 2027.      Baseline DEXA scan in March 2023 with osteopenia.  Vitamin D level ordered and will be drawn today, will replace if deficient.  Will have repeat DEXA in 2 years which will be March 2025.    Patient with left breast mammogram on 5/23/2024, which resulted in a biopsy which fortunately was benign.  She will be due for bilateral diagnostic mammograms on November 2024, towards the end of the month.  Order given today.    Axillary webbing:  Continue with HEP.        MDM  moderate risk.    No orders of the defined types were placed in this encounter.      Results From Past 48 Hours:  No results found for this or any previous visit (from the past 48 hour(s)).      Imaging & Referrals:  None     [facility] PREET 2D+3D DIAGNOSTIC [facility] LEFT (CPT=77065/34163) [[id number]] Collected: 05/23/24 1012  Order Status: Completed Updated: 05/23/24 1256  Addenda:      This report includes an Addendum and supersedes previous reports for this   exam.             PROCEDURE: [facility] PREET 2D+3D DIAGNOSTIC [facility] LEFT (CPT=77065/96599)    COMPARISON: [facility]  [facility], [facility] PREET 2D+3D DIAGNOSTIC   [facility] BILAT (PJC=[id number]), 11/07/2023, 10:44 AM.    INDICATIONS: Z17.0 Malignant neoplasm of overlapping sites of left breast   in female, estrogen receptor positive (HCC) C50.812 Malignant neoplasm of   overlapping sites of lef*    TECHNIQUE: Digital diagnostic mammography was performed and images were   reviewed with the Seesmic 1.5.1.5 CAD device.  3D tomosynthesis was   performed and reviewed.         BREAST COMPOSITION:   Category b-Scattered areas fibroglandular density.       FINDINGS: There is an asymmetry in the lateral posterior left breast,   possibly representing in intramammary lymph node.  This is best seen on CC   view.  Focal targeted left breast ultrasound is recommended for complete   evaluation.  Lumpectomy site remains  stable in appearance.    FOCAL TARGETED LEFT BREAST ULTRASOUND: Previously seen seroma has resolved.  Scar is seen to correspond with   patient's lumpectomy site.    No sonographic abnormality is seen to correspond with the asymmetry in the   lateral posterior left breast.    CONCLUSION:   Tomosynthesis guided biopsy is recommended for the asymmetry   in the posterior lateral left breast.    These findings and recommendations were discussed with and acknowledged by   the patient before she left the department.    BI-RADS CATEGORY:     DIAGNOSTIC CATEGORY 4--SUSPICIOUS      RECOMMENDATIONS: STEREOTACTIC-GUIDED CORE BIOPSY: LEFT BREAST            PLEASE NOTE: NORMAL MAMMOGRAM DOES NOT EXCLUDE THE POSSIBILITY OF BREAST   CANCER.  A CLINICALLY SUSPICIOUS PALPABLE LUMP SHOULD BE BIOPSIED.      For patients over the age of 40, the target due date for the patient's   next mammogram has been entered into a reminder system.      Patient received a discharge summary from the technologist after   completion of exam.    Breast marker legend used on images   Triangle = Palpable lump Buchtel = Skin tag or mole BB = Nipple Linear jerzy = Scar   Square = Pain             Dictated by (CST): Wendy Tucker DO on 5/23/2024 at 9:57 AM       Finalized by (CST): Wendy Tucker DO on 5/23/2024 at 12:04 PM           ADDENDUM:      Please note that the recommended biopsies for stereotactic guided biopsy.    BI-RADS CATEGORY: DIAGNOSTIC CATEGORY 4--SUSPICIOUS      RECOMMENDATIONS: STEREOTACTIC BIOPSY WITH 3D/PREET LEFT BREAST            Dictated by (CST): Wendy Tucker DO on 5/23/2024 at 12:53 PM       Finalized by (CST): Wendy Tucker DO on 5/23/2024 at 12:55 PM                   Signed: 05/23/24 1255 by Wendy Tucker DO  Narrative:    PROCEDURE: [facility] PREET 2D+3D DIAGNOSTIC [facility] LEFT (CPT=77065/07214)     COMPARISON: [facility], [facility] PREET 2D+3D DIAGNOSTIC [facility] BILAT (ICQ=[id number]), 11/07/2023, 10:44 AM.     INDICATIONS: Z17.0 Malignant neoplasm of overlapping sites of left breast in female, estrogen receptor positive (HCC) C50.812 Malignant neoplasm of overlapping sites of lef*     TECHNIQUE: Digital diagnostic mammography was performed and images were reviewed with the [employer] 1.5.1.5 CAD device.  3D tomosynthesis was performed and reviewed.        BREAST COMPOSITION:   Category b-Scattered areas fibroglandular density.        FINDINGS: There is an asymmetry in the lateral posterior left breast, possibly representing in intramammary lymph node.  This is best seen on CC view.  Focal targeted left breast ultrasound is recommended for complete evaluation.  Lumpectomy site remains   stable in appearance.     FOCAL TARGETED LEFT BREAST ULTRASOUND:  Previously seen seroma has resolved.  Scar is seen to correspond with patient's lumpectomy site.     No sonographic abnormality is seen to correspond with the asymmetry in the lateral posterior left breast.              Impression:    CONCLUSION:   Tomosynthesis guided biopsy is recommended for the asymmetry in the posterior lateral left breast.     These findings and recommendations  were discussed with and acknowledged by the patient before she left the department.     BI-RADS CATEGORY:    DIAGNOSTIC CATEGORY 4--SUSPICIOUS       RECOMMENDATIONS:  ULTRASOUND-GUIDED CORE BIOPSY: LEFT BREAST               PLEASE NOTE: NORMAL MAMMOGRAM DOES NOT EXCLUDE THE POSSIBILITY OF BREAST CANCER.  A CLINICALLY SUSPICIOUS PALPABLE LUMP SHOULD BE BIOPSIED.       For patients over the age of 40, the target due date for the patient's next mammogram has been entered into a reminder system.       Patient received a discharge summary from the technologist after completion of exam.     Breast marker legend used on images    Triangle = Palpable lump  Burke = Skin tag or mole  BB = Nipple  Linear jerzy = Scar  Square = Pain           Dictated by (CST): Wendy Tucker DO on 5/23/2024 at 9:57 AM      Finalized by (CST): Wendy Tucker DO on 5/23/2024 at 12:04 PM      LEFT (CPT=19081) [[id number]]Collected: 05/31/24 1152 Order Status: CompletedUpdated: 06/03/24 1216 Narrative:  PROCEDURE: [facility] BIOPSY STEREO W/PREET GUIDE 1 SITE LEFT (CPT=19081)      COMPARISON: [facility], [facility] PREET 2D+3D DIAGNOSTIC [facility] LEFT (CPT=77065/39703), 5/23/2024, 9:28 AM.      INDICATIONS: N64.89 Breast asymmetry      DESCRIPTION.   The patient was informed of the nature of the procedure which included a discussion of the benefits and risks including, but not limited to, bleeding and infection.  An informed consent was obtained.       FINDINGS:  The asymmetry in the lateral left breast was localized and targeted utilizing tomosynthesis and stereotactic guidance via  a SUPERIOR approach.  The skin was cleansed and prepped.   2 cc of 1% lidocaine was administered into the skin.  3 cc of    1% lidocaine and 10 cc of 1% lidocaine with epinephrine was administered into deeper breast tissue.  A 9 gauge needle with vacuum assisted biopsy device was advanced into the breast and 6 core specimens were obtained.  X shaped clip  was deployed into   the biopsy site.  The patient tolerated the procedure with no immediate complications.      Postprocedural mammographic images of the LEFT breast demonstrates the X shaped clip in the expected location.       Post biopsy instructions were given to the patient and the patient left the department in stable condition.      PATHOLOGY:    Final Diagnosis:   Left breast, lateral; stereotactic-guided core biopsy:   * Benign breast tissue with benign intramammary lymph node, 4 mm in greatest dimension, negative for metastatic carcinoma.   * No evidence of atypia or malignancy.             Impression:  CONCLUSION: Uneventful tomosynthesis and stereotactic guided biopsy of the LEFT breast performed without immediate complication and marked with X shaped clip in appropriate positioning.         RECOMMENDATION:   Results are benign and concordant.  Patient may return to annual screening mammogram.               Dictated by (CST): Wendy Tucker DO on 5/31/2024 at 11:50 AM       Finalized by (CST): Wendy Tucker DO on 6/03/2024 at 12:15 PM            Specimen to Pathology Tissue [[id number]]Collected: 05/31/24 0929 Order Status: CompletedSpecimen: Breast core from Breast, leftUpdated: 06/03/24 0903 Case Report-- Surgical Pathology                                Case: [id number]                                  Authorizing Provider:  Herber Hall MD         Collected:           05/31/2024 09:29 AM          Ordering Location:     [facility]          Received:            05/31/2024 09:49 AM                                 Mammography - [facility]                                                                       Pathologist:           Weston Dick MD                                                             Specimen:    Breast, left, cores lateral left breast with imaging X-clip                             Final Diagnosis:--      Left breast, lateral; stereotactic-guided core biopsy:   Benign breast tissue with benign intramammary lymph node, 4 mm in greatest dimension, negative for metastatic carcinoma.  No evidence of atypia or malignancy.  See comment.     Comment:  Pankeratin (AE1/AE3) performed on the specimen is negative in the lymph node, supporting the diagnosis of negative for metastatic carcinoma.      The immunohistochemical stains interpreted in this case demonstrated appropriate reactivity of the positive controls. These stains were performed on formalin-fixed, paraffin-embedded tissue sections with each antibody analysis being performed on a separate slide        65

## 2024-06-28 RX ORDER — ENALAPRIL MALEATE 10 MG/1
10 TABLET ORAL DAILY
Qty: 90 TABLET | Refills: 1 | Status: SHIPPED | OUTPATIENT
Start: 2024-06-28

## 2024-06-28 NOTE — TELEPHONE ENCOUNTER
Please Review. Protocol Failed; No Protocol   BP Readings from Last 1 Encounters:   06/06/24 (!) 161/77    Recent Visits  Date Type Provider Dept   03/18/24 Office Visit Sara Loera MD [facility]   08/02/23 Office Visit Parisa Moreno DO [facility]   Showing recent visits within past 540 days with a meds authorizing provider and meeting all other requirements  Future Appointments  No visits were found meeting these conditions.  Showing future appointments within next 150 days with a meds authorizing provider and meeting all other requirements    Requested Prescriptions   Pending Prescriptions Disp Refills    enalapril 10 MG Oral Tab 90 tablet 1     Sig: Take 1 tablet (10 mg total) by mouth daily.       Hypertension Medications Protocol Failed - 6/25/2024 12:43 PM        Failed - CMP or BMP in past 12 months        Failed - Last BP reading less than 140/90     BP Readings from Last 1 Encounters:   06/06/24 (!) 161/77               Failed - EGFRCR or GFRNAA > 50     GFR Evaluation            Passed - In person appointment or virtual visit in the past 12 mos or appointment in next 3 mos     Recent Outpatient Visits              3 weeks ago Malignant neoplasm of overlapping sites of left breast in female, estrogen receptor positive (HCC)    [facility] Herber Hall MD    Office Visit    3 months ago Type 2 diabetes mellitus without complication, without long-term current use of insulin (HCC)    [facility] Sara Loera MD    Office Visit    9 months ago     [facility] Analia Bejarano PTA    Office Visit    10 months ago     [facility] Analia Bejarano PTA    Office Visit    10 months ago     [facility] Jen Pena PT    Office Visit          Future Appointments         Provider Department Appt Notes    In 5 months Herber Hall MD [facility]ematology Oncology  FOLLOW UP [email or web address].CL  6M                           Future Appointments         Provider Department Appt Notes    In 5 months Herber Hall MD [facility] Hematology Oncology FOLLOW UP [email or web address].CL  6M          Recent Outpatient Visits              3 weeks ago Malignant neoplasm of overlapping sites of left breast in female, estrogen receptor positive (HCC)    [facility] Herber Hall MD    Office Visit    3 months ago Type 2 diabetes mellitus without complication, without long-term current use of insulin (HCC)    [facility] Sara Loera MD    Office Visit    9 months ago     [facility] Analia Bejarano PTA    Office Visit    10 months ago     [facility] Analia Bejarano PTA    Office Visit    10 months ago     [facility] Jen Pena, ANIL    Office Visit

## 2024-06-28 NOTE — TELEPHONE ENCOUNTER
Outgoing call to patient to assist scheduling her physical .  A detail message was left for patient to give us a call back .

## 2024-07-24 ENCOUNTER — TELEPHONE (OUTPATIENT)
Facility: CLINIC | Age: 74
End: 2024-07-24

## 2024-07-24 NOTE — TELEPHONE ENCOUNTER
Left  a detailed voicemail to please schedule her physical and she is over due for her diabetic eye exam with Dr. Greenwood office.

## 2024-08-08 DIAGNOSIS — C50.812 MALIGNANT NEOPLASM OF OVERLAPPING SITES OF LEFT BREAST IN FEMALE, ESTROGEN RECEPTOR POSITIVE (HCC): ICD-10-CM

## 2024-08-08 DIAGNOSIS — Z17.0 MALIGNANT NEOPLASM OF OVERLAPPING SITES OF LEFT BREAST IN FEMALE, ESTROGEN RECEPTOR POSITIVE (HCC): ICD-10-CM

## 2024-08-08 RX ORDER — LETROZOLE 2.5 MG/1
2.5 TABLET, FILM COATED ORAL DAILY
Qty: 90 TABLET | Refills: 3 | Status: SHIPPED | OUTPATIENT
Start: 2024-08-08

## 2024-08-19 RX ORDER — METOPROLOL SUCCINATE 25 MG/1
25 TABLET, EXTENDED RELEASE ORAL DAILY
Qty: 30 TABLET | Refills: 0 | Status: SHIPPED | OUTPATIENT
Start: 2024-08-19

## 2024-08-19 NOTE — TELEPHONE ENCOUNTER
[employer] message sent to patient to schedule an office visit with PCP.   Please make a phone attempt.

## 2024-08-19 NOTE — TELEPHONE ENCOUNTER
Patient overdue for blood pressure follow up and annual physical. Sent in short fill to allow patient to make follow up as requested in March. Can be with myself or PCP Dr Moreno.     Sara Loera MD, 08/19/24, 2:21 PM

## 2024-08-19 NOTE — TELEPHONE ENCOUNTER
Please review; protocol failed    Message sent to patient about lab work needing to be done from 3/18/2024    No future appointments with family medicine/internal medicine.  LAST OFFICE VISIT: 3/18/2024    Requested Prescriptions   Pending Prescriptions Disp Refills    METFORMIN HCL 1000 MG Oral Tab [Pharmacy Med Name: METFORMIN HCL 1,000 MG TABLET] 180 tablet 2     Sig: TAKE 1 TABLET BY MOUTH TWICE A DAY WITH MEALS       Diabetes Medication Protocol Failed - 8/14/2024  1:57 AM        Failed - Last A1C < 7.5 and within past 6 months     Lab Results   Component Value Date    A1C 5.9 (A) 08/02/2023             Failed - EGFRCR or GFRNAA > 50     GFR Evaluation            Failed - GFR in the past 12 months        Passed - In person appointment or virtual visit in the past 6 mos or appointment in next 3 mos     Recent Outpatient Visits              2 months ago Malignant neoplasm of overlapping sites of left breast in female, estrogen receptor positive (HCC)    [facility] Herber Hall MD    Office Visit    5 months ago Type 2 diabetes mellitus without complication, without long-term current use of insulin (HCC)    [facility] Sara Loera MD    Office Visit    11 months ago     [facility] Analia Bejarano PTA    Office Visit    12 months ago     [facility] Analia Bejarano PTA    Office Visit    1 year ago     [facility] Jen Pena PT    Office Visit          Future Appointments         Provider Department Appt Notes    In 4 months Herber Hall MD [facility] FOLLOW UP VISIT.CL, 6M  okay to schedule per KV                    Passed - Microalbumin procedure in past 12 months or taking ACE/ARB          METOPROLOL SUCCINATE ER 25 MG Oral Tablet 24 Hr [Pharmacy Med Name: METOPROLOL SUCC ER 25 MG TAB] 90 tablet 2     Sig: Take 1 tablet (25 mg total) by  mouth daily.       Hypertension Medications Protocol Failed - 8/14/2024  1:57 AM        Failed - CMP or BMP in past 12 months        Failed - Last BP reading less than 140/90     BP Readings from Last 1 Encounters:   06/06/24 (!) 161/77               Failed - EGFRCR or GFRNAA > 50     GFR Evaluation            Passed - In person appointment or virtual visit in the past 12 mos or appointment in next 3 mos     Recent Outpatient Visits              2 months ago Malignant neoplasm of overlapping sites of left breast in female, estrogen receptor positive (HCC)    [facility] Herber Hall MD    Office Visit    5 months ago Type 2 diabetes mellitus without complication, without long-term current use of insulin ([facility])    [facility] Sara Loera MD    Office Visit    11 months ago     [facility] Analia Bejarano PTA    Office Visit    12 months ago     [facility] Analia Bejarano PTA    Office Visit    1 year ago     [facility] Jen Pena PT    Office Visit          Future Appointments         Provider Department Appt Notes    In 4 months Herber Hall MD [facility] FOLLOW UP VISIT.CL, 6M  okay to schedule per KV                         Future Appointments         Provider Department Appt Notes    In 4 months Herber Hall MD [facility] FOLLOW UP VISIT.CL, 6M  okay to schedule per KV          Recent Outpatient Visits              2 months ago Malignant neoplasm of overlapping sites of left breast in female, estrogen receptor positive ([facility])    [facility] Herber Hall MD    Office Visit    5 months ago Type 2 diabetes mellitus without complication, without long-term current use of insulin ([facility])    [facility] Sara Loera MD    Office Visit    11 months ago      [facility] Analia Bejarano, HERBIE    Office Visit    12 months ago     [facility] Analia Bejarano, HERBIE    Office Visit    1 year ago     [facility] Jen Pena, PT    Office Visit

## 2024-09-19 ENCOUNTER — LAB ENCOUNTER (OUTPATIENT)
Dept: LAB | Age: 74
End: 2024-09-19
Attending: FAMILY MEDICINE
Payer: COMMERCIAL

## 2024-09-19 ENCOUNTER — NURSE ONLY (OUTPATIENT)
Dept: INTERNAL MEDICINE CLINIC | Facility: CLINIC | Age: 74
End: 2024-09-19

## 2024-09-19 ENCOUNTER — OFFICE VISIT (OUTPATIENT)
Facility: CLINIC | Age: 74
End: 2024-09-19
Payer: COMMERCIAL

## 2024-09-19 VITALS
OXYGEN SATURATION: 98 % | HEIGHT: 64 IN | SYSTOLIC BLOOD PRESSURE: 175 MMHG | HEART RATE: 86 BPM | DIASTOLIC BLOOD PRESSURE: 88 MMHG | WEIGHT: 172 LBS | BODY MASS INDEX: 29.37 KG/M2

## 2024-09-19 DIAGNOSIS — Z12.11 COLON CANCER SCREENING: ICD-10-CM

## 2024-09-19 DIAGNOSIS — C50.812 MALIGNANT NEOPLASM OF OVERLAPPING SITES OF LEFT BREAST IN FEMALE, ESTROGEN RECEPTOR POSITIVE (HCC): ICD-10-CM

## 2024-09-19 DIAGNOSIS — Z00.00 ENCOUNTER FOR GENERAL ADULT MEDICAL EXAMINATION W/O ABNORMAL FINDINGS: Primary | ICD-10-CM

## 2024-09-19 DIAGNOSIS — E11.9 TYPE 2 DIABETES MELLITUS WITHOUT COMPLICATION, WITHOUT LONG-TERM CURRENT USE OF INSULIN (HCC): ICD-10-CM

## 2024-09-19 DIAGNOSIS — I10 ESSENTIAL HYPERTENSION: ICD-10-CM

## 2024-09-19 DIAGNOSIS — E55.9 VITAMIN D DEFICIENCY: ICD-10-CM

## 2024-09-19 DIAGNOSIS — Z17.0 MALIGNANT NEOPLASM OF OVERLAPPING SITES OF LEFT BREAST IN FEMALE, ESTROGEN RECEPTOR POSITIVE (HCC): ICD-10-CM

## 2024-09-19 DIAGNOSIS — Z85.850 HX OF THYROID CANCER: ICD-10-CM

## 2024-09-19 DIAGNOSIS — E03.9 HYPOTHYROIDISM, UNSPECIFIED TYPE: ICD-10-CM

## 2024-09-19 DIAGNOSIS — Z00.00 ENCOUNTER FOR GENERAL ADULT MEDICAL EXAMINATION W/O ABNORMAL FINDINGS: ICD-10-CM

## 2024-09-19 DIAGNOSIS — E78.2 MIXED HYPERLIPIDEMIA: ICD-10-CM

## 2024-09-19 LAB
ALBUMIN SERPL-MCNC: 4.2 G/DL (ref 3.2–4.8)
ALBUMIN/GLOB SERPL: 1.4 {RATIO} (ref 1–2)
ALP LIVER SERPL-CCNC: 86 U/L
ALT SERPL-CCNC: 28 U/L
ANION GAP SERPL CALC-SCNC: 6 MMOL/L (ref 0–18)
AST SERPL-CCNC: 28 U/L (ref ?–34)
BASOPHILS # BLD AUTO: 0.07 X10(3) UL (ref 0–0.2)
BASOPHILS NFR BLD AUTO: 1.2 %
BILIRUB SERPL-MCNC: 1.2 MG/DL (ref 0.2–1.1)
BUN BLD-MCNC: 11 MG/DL (ref 9–23)
BUN/CREAT SERPL: 16.9 (ref 10–20)
CALCIUM BLD-MCNC: 10 MG/DL (ref 8.7–10.4)
CHLORIDE SERPL-SCNC: 107 MMOL/L (ref 98–112)
CHOLEST SERPL-MCNC: 218 MG/DL (ref ?–200)
CO2 SERPL-SCNC: 28 MMOL/L (ref 21–32)
CREAT BLD-MCNC: 0.65 MG/DL
CREAT UR-SCNC: 28.8 MG/DL
DEPRECATED RDW RBC AUTO: 43.6 FL (ref 35.1–46.3)
EGFRCR SERPLBLD CKD-EPI 2021: 93 ML/MIN/1.73M2 (ref 60–?)
EOSINOPHIL # BLD AUTO: 0.3 X10(3) UL (ref 0–0.7)
EOSINOPHIL NFR BLD AUTO: 5 %
ERYTHROCYTE [DISTWIDTH] IN BLOOD BY AUTOMATED COUNT: 14 % (ref 11–15)
EST. AVERAGE GLUCOSE BLD GHB EST-MCNC: 137 MG/DL (ref 68–126)
FASTING PATIENT LIPID ANSWER: YES
FASTING STATUS PATIENT QL REPORTED: YES
GLOBULIN PLAS-MCNC: 3 G/DL (ref 2–3.5)
GLUCOSE BLD-MCNC: 114 MG/DL (ref 70–99)
HBA1C MFR BLD: 6.4 % (ref ?–5.7)
HCT VFR BLD AUTO: 40.2 %
HDLC SERPL-MCNC: 68 MG/DL (ref 40–59)
HGB BLD-MCNC: 13.1 G/DL
IMM GRANULOCYTES # BLD AUTO: 0.02 X10(3) UL (ref 0–1)
IMM GRANULOCYTES NFR BLD: 0.3 %
LDLC SERPL CALC-MCNC: 131 MG/DL (ref ?–100)
LYMPHOCYTES # BLD AUTO: 1.33 X10(3) UL (ref 1–4)
LYMPHOCYTES NFR BLD AUTO: 22.3 %
MCH RBC QN AUTO: 27.8 PG (ref 26–34)
MCHC RBC AUTO-ENTMCNC: 32.6 G/DL (ref 31–37)
MCV RBC AUTO: 85.2 FL
MICROALBUMIN UR-MCNC: <0.3 MG/DL
MONOCYTES # BLD AUTO: 0.52 X10(3) UL (ref 0.1–1)
MONOCYTES NFR BLD AUTO: 8.7 %
NEUTROPHILS # BLD AUTO: 3.72 X10 (3) UL (ref 1.5–7.7)
NEUTROPHILS # BLD AUTO: 3.72 X10(3) UL (ref 1.5–7.7)
NEUTROPHILS NFR BLD AUTO: 62.5 %
NONHDLC SERPL-MCNC: 150 MG/DL (ref ?–130)
OSMOLALITY SERPL CALC.SUM OF ELEC: 292 MOSM/KG (ref 275–295)
PLATELET # BLD AUTO: 284 10(3)UL (ref 150–450)
POTASSIUM SERPL-SCNC: 4.2 MMOL/L (ref 3.5–5.1)
PROT SERPL-MCNC: 7.2 G/DL (ref 5.7–8.2)
RBC # BLD AUTO: 4.72 X10(6)UL
SODIUM SERPL-SCNC: 141 MMOL/L (ref 136–145)
T3FREE SERPL-MCNC: 3.07 PG/ML (ref 2.4–4.2)
T4 FREE SERPL-MCNC: 1.6 NG/DL (ref 0.8–1.7)
TRIGL SERPL-MCNC: 107 MG/DL (ref 30–149)
TSI SER-ACNC: 0.15 MIU/ML (ref 0.55–4.78)
VIT D+METAB SERPL-MCNC: 49.1 NG/ML (ref 30–100)
VLDLC SERPL CALC-MCNC: 19 MG/DL (ref 0–30)
WBC # BLD AUTO: 6 X10(3) UL (ref 4–11)

## 2024-09-19 PROCEDURE — 3008F BODY MASS INDEX DOCD: CPT | Performed by: FAMILY MEDICINE

## 2024-09-19 PROCEDURE — 92229 IMG RTA DETC/MNTR DS POC ALY: CPT | Performed by: FAMILY MEDICINE

## 2024-09-19 PROCEDURE — 99397 PER PM REEVAL EST PAT 65+ YR: CPT | Performed by: FAMILY MEDICINE

## 2024-09-19 PROCEDURE — 82043 UR ALBUMIN QUANTITATIVE: CPT | Performed by: FAMILY MEDICINE

## 2024-09-19 PROCEDURE — 99214 OFFICE O/P EST MOD 30 MIN: CPT | Performed by: FAMILY MEDICINE

## 2024-09-19 PROCEDURE — 84481 FREE ASSAY (FT-3): CPT | Performed by: FAMILY MEDICINE

## 2024-09-19 PROCEDURE — 80050 GENERAL HEALTH PANEL: CPT | Performed by: FAMILY MEDICINE

## 2024-09-19 PROCEDURE — 82570 ASSAY OF URINE CREATININE: CPT | Performed by: FAMILY MEDICINE

## 2024-09-19 PROCEDURE — 80061 LIPID PANEL: CPT | Performed by: FAMILY MEDICINE

## 2024-09-19 PROCEDURE — 2033F EYE IMG VALID W/O RTNOPTHY: CPT | Performed by: FAMILY MEDICINE

## 2024-09-19 PROCEDURE — 83036 HEMOGLOBIN GLYCOSYLATED A1C: CPT | Performed by: FAMILY MEDICINE

## 2024-09-19 PROCEDURE — 82306 VITAMIN D 25 HYDROXY: CPT | Performed by: FAMILY MEDICINE

## 2024-09-19 PROCEDURE — 3079F DIAST BP 80-89 MM HG: CPT | Performed by: FAMILY MEDICINE

## 2024-09-19 PROCEDURE — 3077F SYST BP >= 140 MM HG: CPT | Performed by: FAMILY MEDICINE

## 2024-09-19 PROCEDURE — 84439 ASSAY OF FREE THYROXINE: CPT | Performed by: FAMILY MEDICINE

## 2024-09-19 RX ORDER — ENALAPRIL MALEATE 20 MG/1
20 TABLET ORAL DAILY
Qty: 90 TABLET | Refills: 3 | Status: SHIPPED | OUTPATIENT
Start: 2024-09-19

## 2024-09-19 NOTE — PROGRESS NOTES
Subjective:   Cynthia Larson is a 73 year old female who presents for Physical     DM  At last patient visit does was decreased from 2 tablets daily to one tablet daily. Needs RX updated to reflect this    Breast cancer  Following with oncology Dr Hall. Had recent breast biopsy for concern for assymetry noted in left breast. Biopsy returned benign. Continues on Letrozole.     Hypothyroidism and hx of papillary thyroid cancer  Has established with New endocrinologist through [place] Dr Decker.     HTN  Did take enalapril and metoprolol this morning. Very active. Walks about 3-4 miles most days. Eats healthy. No read meat. Mostly chicken and salads. HTN does run in the family. Both mother and father had hypertension    Will be having knee surgery possible by end of January early feburary 2025. Right knee.     History/Other:    Chief Complaint Reviewed and Verified  No Further Nursing Notes to   Review  Tobacco Reviewed  Allergies Reviewed  Medications Reviewed    Problem List Reviewed  Medical History Reviewed  Surgical History   Reviewed  Family History Reviewed         Tobacco:  She smoked tobacco in the past but quit greater than 12 months ago.  Social History     Tobacco Use   Smoking Status Former    Types: Cigarettes   Smokeless Tobacco Never   Tobacco Comments    pt quit about 50 yrs ago         Current Outpatient Medications   Medication Sig Dispense Refill    metFORMIN HCl 1000 MG Oral Tab Take 1 tablet (1,000 mg total) by mouth daily with breakfast. 90 tablet 3    enalapril 20 MG Oral Tab Take 1 tablet (20 mg total) by mouth daily. 90 tablet 3    metoprolol succinate ER 25 MG Oral Tablet 24 Hr Take 1 tablet (25 mg total) by mouth daily. 90 tablet 0    LETROZOLE 2.5 MG Oral Tab TAKE 1 TABLET BY MOUTH EVERY DAY 90 tablet 3    Cholecalciferol (VITAMIN D3) 25 MCG (1000 UT) Oral Cap Take 1 tablet by mouth daily.      Multiple Minerals-Vitamins (CALCIUM-MAGNESIUM-ZINC-D3 OR) Take by mouth daily. Calcium  500mg/Magnesium 80mg/Zinc 10mg/Vit D3 20mg      NON FORMULARY OSTEO BI-FLEX PO      LEVOTHYROXINE 112 MCG Oral Tab 1 tablet (112 mcg total).           Review of Systems:  Review of Systems   Constitutional: Negative.    HENT: Negative.     Eyes: Negative.    Respiratory: Negative.     Cardiovascular: Negative.    Gastrointestinal: Negative.    Genitourinary: Negative.    Musculoskeletal: Negative.    Skin: Negative.    Neurological: Negative.    Psychiatric/Behavioral: Negative.           Objective:   BP (!) 175/88   Pulse 86   Ht 5' 4\" (1.626 m)   Wt 172 lb (78 kg)   SpO2 98%   BMI 29.52 kg/m²  Estimated body mass index is 29.52 kg/m² as calculated from the following:    Height as of this encounter: 5' 4\" (1.626 m).    Weight as of this encounter: 172 lb (78 kg).    BP Readings from Last 6 Encounters:   09/19/24 (!) 175/88   06/06/24 (!) 161/77   03/18/24 142/84   08/02/23 156/84   06/07/23 (!) 174/92   05/25/23 (!) 177/71      Physical Exam  Vitals and nursing note reviewed.   Constitutional:       General: She is not in acute distress.     Appearance: Normal appearance. She is not ill-appearing.   HENT:      Head: Normocephalic and atraumatic.      Right Ear: Tympanic membrane normal. There is no impacted cerumen.      Left Ear: Tympanic membrane normal. There is no impacted cerumen.      Mouth/Throat:      Mouth: Mucous membranes are moist.      Pharynx: Oropharynx is clear. No oropharyngeal exudate or posterior oropharyngeal erythema.   Eyes:      General:         Right eye: No discharge.         Left eye: No discharge.      Extraocular Movements: Extraocular movements intact.      Pupils: Pupils are equal, round, and reactive to light.   Cardiovascular:      Rate and Rhythm: Normal rate and regular rhythm.      Heart sounds: Normal heart sounds. No murmur heard.     No friction rub. No gallop.   Pulmonary:      Effort: Pulmonary effort is normal.      Breath sounds: Normal breath sounds. No wheezing,  rhonchi or rales.   Abdominal:      General: Abdomen is flat. Bowel sounds are normal. There is no distension.      Palpations: Abdomen is soft. There is no mass.      Tenderness: There is no abdominal tenderness. There is no guarding or rebound.   Musculoskeletal:         General: Normal range of motion.      Cervical back: Normal range of motion.      Right lower leg: No edema.      Left lower leg: No edema.   Skin:     General: Skin is warm and dry.      Findings: No rash.   Neurological:      General: No focal deficit present.      Mental Status: She is alert. Mental status is at baseline.   Psychiatric:         Mood and Affect: Mood normal.         Behavior: Behavior normal.       Bilateral barefoot skin diabetic exam is normal, visualized feet and the appearance is normal.  Bilateral monofilament/sensation of both feet is normal.  Pulsation pedal pulse exam of both lower legs/feet is normal as well.       Assessment & Plan:   1. Encounter for general adult medical examination w/o abnormal findings (Primary)  -     CBC With Differential With Platelet; Future; Expected date: 09/19/2024  -     Lipid Panel; Future; Expected date: 09/19/2024  -     TSH W Reflex To Free T4; Future; Expected date: 09/19/2024  -     Vitamin D; Future; Expected date: 09/19/2024    -ordered annual labs    2. Type 2 diabetes mellitus without complication, without long-term current use of insulin (HCC)  -     Diabetic Retinopathy Exam; Future; Expected date: 09/19/2024  -     metFORMIN HCl; Take 1 tablet (1,000 mg total) by mouth daily with breakfast.  Dispense: 90 tablet; Refill: 3    -well controlled. Last A1c in 8/2023 5.9  -ordered updated diabetic monitoring labs.   -patient to get updated eye exam today    3. Malignant neoplasm of overlapping sites of left breast in female, estrogen receptor positive (HCC)  -stable on  Letrozole. To continue follow up with oncology Dr Hall    4. Essential hypertension  -     Enalapril Maleate; Take 1  tablet (20 mg total) by mouth daily.  Dispense: 90 tablet; Refill: 3    -not well controlled. Increased enalapril to 20 mg    5. Mixed hyperlipidemia  -ordered updated lipid panel    6. Hypothyroidism, unspecified type  -stable. Ordered updated TSH. Advised continued follow up with endocrinology Dr Decker    7. Vitamin D deficiency  Patient is supplementing with vitamin D. Ordered updated level    8. Hx of thyroid cancer  -stable. Ordered updated TSH. Advised continued follow up with endocrinology Dr Decker    9. Colon cancer screening  -     GASTRO - INTERNAL    -due for colon cancer screening placed referral to GI to complete      Return in about 4 weeks (around 10/17/2024) for HTN.    Sara Loera MD, 9/19/2024, 8:44 AM

## 2024-10-17 ENCOUNTER — OFFICE VISIT (OUTPATIENT)
Facility: CLINIC | Age: 74
End: 2024-10-17
Payer: COMMERCIAL

## 2024-10-17 VITALS
DIASTOLIC BLOOD PRESSURE: 86 MMHG | OXYGEN SATURATION: 98 % | HEIGHT: 64 IN | HEART RATE: 68 BPM | SYSTOLIC BLOOD PRESSURE: 174 MMHG | BODY MASS INDEX: 29.53 KG/M2 | WEIGHT: 173 LBS

## 2024-10-17 DIAGNOSIS — Z23 NEED FOR INFLUENZA VACCINATION: ICD-10-CM

## 2024-10-17 DIAGNOSIS — Z12.11 COLON CANCER SCREENING: ICD-10-CM

## 2024-10-17 DIAGNOSIS — J30.89 SEASONAL ALLERGIC RHINITIS DUE TO OTHER ALLERGIC TRIGGER: ICD-10-CM

## 2024-10-17 DIAGNOSIS — E78.2 MIXED HYPERLIPIDEMIA: ICD-10-CM

## 2024-10-17 DIAGNOSIS — I10 ESSENTIAL HYPERTENSION: Primary | ICD-10-CM

## 2024-10-17 DIAGNOSIS — M25.561 CHRONIC PAIN OF RIGHT KNEE: ICD-10-CM

## 2024-10-17 DIAGNOSIS — G89.29 CHRONIC PAIN OF RIGHT KNEE: ICD-10-CM

## 2024-10-17 PROCEDURE — 99214 OFFICE O/P EST MOD 30 MIN: CPT | Performed by: FAMILY MEDICINE

## 2024-10-17 PROCEDURE — 90662 IIV NO PRSV INCREASED AG IM: CPT | Performed by: FAMILY MEDICINE

## 2024-10-17 PROCEDURE — 3061F NEG MICROALBUMINURIA REV: CPT | Performed by: FAMILY MEDICINE

## 2024-10-17 PROCEDURE — 90471 IMMUNIZATION ADMIN: CPT | Performed by: FAMILY MEDICINE

## 2024-10-17 PROCEDURE — 3079F DIAST BP 80-89 MM HG: CPT | Performed by: FAMILY MEDICINE

## 2024-10-17 PROCEDURE — 3008F BODY MASS INDEX DOCD: CPT | Performed by: FAMILY MEDICINE

## 2024-10-17 PROCEDURE — 3044F HG A1C LEVEL LT 7.0%: CPT | Performed by: FAMILY MEDICINE

## 2024-10-17 PROCEDURE — 3077F SYST BP >= 140 MM HG: CPT | Performed by: FAMILY MEDICINE

## 2024-10-17 RX ORDER — AMLODIPINE BESYLATE 2.5 MG/1
2.5 TABLET ORAL DAILY
Qty: 30 TABLET | Refills: 1 | Status: SHIPPED | OUTPATIENT
Start: 2024-10-17

## 2024-10-17 NOTE — PROGRESS NOTES
Subjective:   Cynthia Larson is a 73 year old female who presents for Follow - Up (HTN)     Patient presents for follow up HTN. At last visit increased enalapril to 20 mg. Continued metoprolol ER 25 mg daily. Did take both medications today    Right knee surgery  Still planing for surgery in early January 2025    Allergies  Has developed over the years. Is having post nasal drip. Is wondering what is safe to take over the counter.     HLD  ASCVD risk 49.7% on labs. Patient does not with to start statin at this time.       Colon cancer screening  Patient is open to cologuard      History/Other:    Chief Complaint Reviewed and Verified  Nursing Notes Reviewed and   Verified  Tobacco Reviewed  Allergies Reviewed  Medications Reviewed    Problem List Reviewed  Medical History Reviewed  Surgical History   Reviewed  OB Status Reviewed  Family History Reviewed  Social History   Reviewed         Tobacco:  She smoked tobacco in the past but quit greater than 12 months ago.  Social History     Tobacco Use   Smoking Status Former    Types: Cigarettes   Smokeless Tobacco Never   Tobacco Comments    pt quit about 50 yrs ago         Current Outpatient Medications   Medication Sig Dispense Refill    amLODIPine 2.5 MG Oral Tab Take 1 tablet (2.5 mg total) by mouth daily. 30 tablet 1    metFORMIN HCl 1000 MG Oral Tab Take 1 tablet (1,000 mg total) by mouth daily with breakfast. 90 tablet 3    enalapril 20 MG Oral Tab Take 1 tablet (20 mg total) by mouth daily. 90 tablet 3    metoprolol succinate ER 25 MG Oral Tablet 24 Hr Take 1 tablet (25 mg total) by mouth daily. 90 tablet 0    LETROZOLE 2.5 MG Oral Tab TAKE 1 TABLET BY MOUTH EVERY DAY 90 tablet 3    Cholecalciferol (VITAMIN D3) 25 MCG (1000 UT) Oral Cap Take 1 tablet by mouth daily.      Multiple Minerals-Vitamins (CALCIUM-MAGNESIUM-ZINC-D3 OR) Take by mouth daily. Calcium 500mg/Magnesium 80mg/Zinc 10mg/Vit D3 20mg      NON FORMULARY OSTEO BI-FLEX PO       LEVOTHYROXINE 112 MCG Oral Tab 1 tablet (112 mcg total).           Review of Systems:  Review of Systems   Constitutional: Negative.    HENT:  Positive for postnasal drip.    Respiratory:  Positive for cough.    Cardiovascular: Negative.    Gastrointestinal: Negative.    Musculoskeletal:  Positive for arthralgias (right knee pain chronic).   Skin: Negative.    Neurological: Negative.          Objective:   BP (!) 174/86   Pulse 68   Ht 5' 4\" (1.626 m)   Wt 173 lb (78.5 kg)   SpO2 98%   BMI 29.70 kg/m²  Estimated body mass index is 29.7 kg/m² as calculated from the following:    Height as of this encounter: 5' 4\" (1.626 m).    Weight as of this encounter: 173 lb (78.5 kg).    BP Readings from Last 5 Encounters:   10/17/24 (!) 174/86   09/19/24 (!) 175/88   06/06/24 (!) 161/77   03/18/24 142/84   08/02/23 156/84       Physical Exam  Vitals and nursing note reviewed.   Constitutional:       General: She is not in acute distress.     Appearance: Normal appearance.   HENT:      Head: Normocephalic and atraumatic.   Eyes:      General:         Right eye: No discharge.         Left eye: No discharge.      Comments: Extraocular eye movements grossly intact   Pulmonary:      Effort: Pulmonary effort is normal.   Musculoskeletal:      Comments: Normal gait   Skin:     Findings: No rash.   Neurological:      General: No focal deficit present.      Mental Status: She is alert. Mental status is at baseline.   Psychiatric:         Mood and Affect: Mood normal.         Behavior: Behavior normal.           Assessment & Plan:   1. Essential hypertension (Primary)  -     amLODIPine Besylate; Take 1 tablet (2.5 mg total) by mouth daily.  Dispense: 30 tablet; Refill: 1    -not well controlled. Discussed options of increasing metoprolol dose versus adding on hydrochlorothiazide or amlodpine. After discussion, will add on amlodipine. The patient educated on disease process, medication use and side effects.    2. Seasonal allergic  rhinitis due to other allergic trigger  -counseled patient to trial claritin or xyzal    3. Chronic pain of right knee  Plan is for surgery in Early 2025. Patient will make follow up preop appointment    4. Colon cancer screening  -     COLOGUARD COLON CANCER SCREENING (EXTERNAL)    -due ordered    5. Need for influenza vaccination  -     High Dose Fluzone trivalent influenza, 65 yrs+ PFS [59078]    -administered by staff    6. Mixed hyperlipidemia  Patient aware of elevated risk of heart attack and stroke given no statin treatment and ASCVD risk score. Patient declines statin      Return in about 2 months (around 12/17/2024) for HTN and preop.    Sara Loera MD, 10/17/2024, 8:07 AM

## 2024-11-14 ENCOUNTER — HOSPITAL ENCOUNTER (OUTPATIENT)
Dept: MAMMOGRAPHY | Facility: HOSPITAL | Age: 74
Discharge: HOME | End: 2024-11-14
Attending: INTERNAL MEDICINE
Payer: COMMERCIAL

## 2024-11-14 DIAGNOSIS — C50.812 MALIGNANT NEOPLASM OF OVERLAPPING SITES OF LEFT BREAST IN FEMALE, ESTROGEN RECEPTOR POSITIVE (HCC): ICD-10-CM

## 2024-11-14 DIAGNOSIS — Z17.0 MALIGNANT NEOPLASM OF OVERLAPPING SITES OF LEFT BREAST IN FEMALE, ESTROGEN RECEPTOR POSITIVE (HCC): ICD-10-CM

## 2024-11-14 PROCEDURE — 77066 DX MAMMO INCL CAD BI: CPT | Performed by: INTERNAL MEDICINE

## 2024-11-14 PROCEDURE — 77062 BREAST TOMOSYNTHESIS BI: CPT | Performed by: INTERNAL MEDICINE

## 2024-11-19 DIAGNOSIS — E11.9 TYPE 2 DIABETES MELLITUS WITHOUT COMPLICATION, WITHOUT LONG-TERM CURRENT USE OF INSULIN (HCC): ICD-10-CM

## 2024-11-20 ENCOUNTER — TELEPHONE (OUTPATIENT)
Facility: CLINIC | Age: 74
End: 2024-11-20

## 2024-11-20 DIAGNOSIS — I10 ESSENTIAL HYPERTENSION: ICD-10-CM

## 2024-11-22 RX ORDER — METOPROLOL SUCCINATE 25 MG/1
25 TABLET, EXTENDED RELEASE ORAL DAILY
Qty: 90 TABLET | Refills: 0 | Status: SHIPPED | OUTPATIENT
Start: 2024-11-22

## 2024-11-22 NOTE — TELEPHONE ENCOUNTER
Please review; protocol failed/No Protocol    Requested Prescriptions   Pending Prescriptions Disp Refills    METOPROLOL SUCCINATE ER 25 MG Oral Tablet 24 Hr [Pharmacy Med Name: METOPROLOL SUCC ER 25 MG TAB] 90 tablet 0     Sig: TAKE 1 TABLET (25 MG TOTAL) BY MOUTH DAILY.       Hypertension Medications Protocol Failed - 11/22/2024  1:06 PM        Failed - Last BP reading less than 140/90     BP Readings from Last 1 Encounters:   10/17/24 (!) 174/86               Passed - CMP or BMP in past 12 months        Passed - In person appointment or virtual visit in the past 12 mos or appointment in next 3 mos     Recent Outpatient Visits              1 month ago Essential hypertension    [facility] Sara Loera MD    Office Visit    2 months ago Type 2 diabetes mellitus without complication, without long-term current use of insulin ([facility])    [facility]    Nurse Only    2 months ago Encounter for general adult medical examination w/o abnormal findings    [facility] Sara Loera MD    Office Visit    5 months ago Malignant neoplasm of overlapping sites of left breast in female, estrogen receptor positive ([facility])    [facility] Herber Hall MD    Office Visit    8 months ago Type 2 diabetes mellitus without complication, without long-term current use of insulin ([facility])    [facility] Sara Loera MD    Office Visit          Future Appointments         Provider Department Appt Notes    In 3 weeks Herber Hall MD [facility] Hematology Oncology FOLLOW UP VISIT.CL, 6M  okay to schedule per KV                    Passed - EGFRCR or GFRNAA > 50     GFR Evaluation  EGFRCR: 93 , resulted on 9/19/2024           Refused Prescriptions Disp Refills    METFORMIN HCL 1000 MG Oral Tab [Pharmacy Med Name: METFORMIN HCL 1,000 MG TABLET] 180  tablet 0     Sig: TAKE 1 TABLET BY MOUTH TWICE A DAY WITH MEALS       Diabetes Medication Protocol Passed - 11/22/2024  1:06 PM        Passed - Last A1C < 7.5 and within past 6 months     Lab Results   Component Value Date    A1C 6.4 (H) 09/19/2024             Passed - In person appointment or virtual visit in the past 6 mos or appointment in next 3 mos     Recent Outpatient Visits              1 month ago Essential hypertension    [facility] Sara Loera MD    Office Visit    2 months ago Type 2 diabetes mellitus without complication, without long-term current use of insulin ([facility])    [facility]    Nurse Only    2 months ago Encounter for general adult medical examination w/o abnormal findings    [facility] Sara Loera MD    Office Visit    5 months ago Malignant neoplasm of overlapping sites of left breast in female, estrogen receptor positive ([facility])    [facility] Herber Hall MD    Office Visit    8 months ago Type 2 diabetes mellitus without complication, without long-term current use of insulin ([facility])    [facility] Sara Loera MD    Office Visit          Future Appointments         Provider Department Appt Notes    In 3 weeks Herber Hall MD [facility]ematology Oncology FOLLOW UP VISIT.CL, 6M  okay to schedule per KV                    Passed - Microalbumin procedure in past 12 months or taking ACE/ARB        Passed - EGFRCR or GFRNAA > 50     GFR Evaluation  EGFRCR: 93 , resulted on 9/19/2024          Passed - GFR in the past 12 months           Future Appointments         Provider Department Appt Notes    In 3 weeks Herber Hall MD [facility] Hematology Oncology FOLLOW UP VISIT.CL, 6M  okay to schedule per KV          Recent Outpatient Visits              1 month ago Essential hypertension     [facility] Sara Loera MD    Office Visit    2 months ago Type 2 diabetes mellitus without complication, without long-term current use of insulin (HCC)    [facility]    Nurse Only    2 months ago Encounter for general adult medical examination w/o abnormal findings    [facility] Sara Loera MD    Office Visit    5 months ago Malignant neoplasm of overlapping sites of left breast in female, estrogen receptor positive (HCC)    [facility] Herber Hall MD    Office Visit    8 months ago Type 2 diabetes mellitus without complication, without long-term current use of insulin (HCC)    [facility] Sara Loera MD    Office Visit

## 2024-11-22 NOTE — TELEPHONE ENCOUNTER
Metformin 1000m year supply sent to [facility] in [place] on [address] on 2024

## 2024-11-22 NOTE — TELEPHONE ENCOUNTER
Refilled but please encourage patient schedule follow up appointment. Had wanted to see patient in December     Sara Loera MD, 11/22/24, 1:30 PM

## 2024-11-22 NOTE — TELEPHONE ENCOUNTER
[employer] message sent to patient to schedule an office visit with PCP.   Please make a phone attempt.

## 2024-11-25 ENCOUNTER — PATIENT MESSAGE (OUTPATIENT)
Dept: OTHER | Age: 74
End: 2024-11-25

## 2024-11-25 RX ORDER — AMLODIPINE BESYLATE 2.5 MG/1
2.5 TABLET ORAL DAILY
Qty: 30 TABLET | Refills: 0 | Status: SHIPPED | OUTPATIENT
Start: 2024-11-25

## 2024-11-25 NOTE — TELEPHONE ENCOUNTER
Per office visit with Dr. Loera 10/17/24:  Essential hypertension (Primary)   amLODIPine Besylate; Take 1 tablet (2.5 mg total) by mouth daily.  Dispense: 30 tablet; Refill: 1  -not well controlled. Discussed options of increasing metoprolol dose versus adding on hydrochlorothiazide or amlodpine. After discussion, will add on amlodipine. The patient educated on disease process, medication use and side effects.

## 2024-11-25 NOTE — TELEPHONE ENCOUNTER
Dr. Loera please kindly review; protocol failed    No future appointments with family medicine/internal medicine.  Last office visit: 10/17/24    Amlodipine 2.5 mg was initiated at last office visit 10/17/24. Patient was advised to schedule a blood pressure follow up appointment. No appointment has made.    [employer] message sent and will route once more to the  to help schedule an appointment.     Requested Prescriptions   Pending Prescriptions Disp Refills    amLODIPine 2.5 MG Oral Tab 90 tablet 1     Sig: Take 1 tablet (2.5 mg total) by mouth daily.       Hypertension Medications Protocol Failed - 11/25/2024  4:22 PM        Failed - Last BP reading less than 140/90     BP Readings from Last 1 Encounters:   10/17/24 (!) 174/86               Passed - CMP or BMP in past 12 months        Passed - In person appointment or virtual visit in the past 12 mos or appointment in next 3 mos     Recent Outpatient Visits              1 month ago Essential hypertension    [facility] Sara Loera MD    Office Visit    2 months ago Type 2 diabetes mellitus without complication, without long-term current use of insulin (HCC)    [facility]    Nurse Only    2 months ago Encounter for general adult medical examination w/o abnormal findings    [facility] Sara Loera MD    Office Visit    5 months ago Malignant neoplasm of overlapping sites of left breast in female, estrogen receptor positive ([facility])    [facility] Hematology Oncology Herber Hall MD    Office Visit    8 months ago Type 2 diabetes mellitus without complication, without long-term current use of insulin (HCC)    [facility] aSra Loera MD    Office Visit          Future Appointments         Provider Department Appt Notes    In 3 weeks Herber Hall MD [facility] Hematology  Oncology FOLLOW UP VISIT.CL, 6M  okay to schedule per KV                    Passed - EGFRCR or GFRNAA > 50     GFR Evaluation  EGFRCR: 93 , resulted on 9/19/2024               Future Appointments         Provider Department Appt Notes    In 3 weeks Herber Hall MD [facility] Hematology Oncology FOLLOW UP VISIT.CL, 6M  okay to schedule per KV          Recent Outpatient Visits              1 month ago Essential hypertension    [facility] Sara Loera MD    Office Visit    2 months ago Type 2 diabetes mellitus without complication, without long-term current use of insulin (HCC)    [facility]    Nurse Only    2 months ago Encounter for general adult medical examination w/o abnormal findings    [facility] Ort[facility]    Office Visit    5 months ago Malignant neoplasm of overlapping sites of left breast in female, estrogen receptor positive (HCC)    [facility] Hematology Oncology Herber Hall MD    Office Visit    8 months ago Type 2 diabetes mellitus without complication, without long-term current use of insulin (HCC)    [facility] Sara Loera MD    Office Visit

## 2024-11-25 NOTE — TELEPHONE ENCOUNTER
Please call patient to assist in making a routine blood pressure follow up appointment. Thank you      Last office visit: 10/17/24    A [employer] message has been sent to patient     **Please attempt all available phone numbers in patient's chart. This includes alternative numbers and contacts on patient's release of information. Thank you.

## 2024-11-25 NOTE — TELEPHONE ENCOUNTER
Outgoing call to patient to set up a follow up appointment. Unable to connect with patient. LDMTCB.    No further action is required at this time.

## 2024-12-03 NOTE — TELEPHONE ENCOUNTER
Patient called states she was told she needs a Nurse visit for a BP check and would like to schedule it--> scheduled. Patient verbalized understanding. No further questions or concerns at this time.    Future Appointments   Date Time Provider Department Center   12/5/2024  1:00 PM [facility] FM OP NURSE [facility] FP [facility] OP

## 2024-12-05 ENCOUNTER — NURSE ONLY (OUTPATIENT)
Facility: CLINIC | Age: 74
End: 2024-12-05
Payer: COMMERCIAL

## 2024-12-05 VITALS — SYSTOLIC BLOOD PRESSURE: 138 MMHG | DIASTOLIC BLOOD PRESSURE: 80 MMHG

## 2024-12-05 PROCEDURE — 3075F SYST BP GE 130 - 139MM HG: CPT | Performed by: FAMILY MEDICINE

## 2024-12-05 PROCEDURE — 3079F DIAST BP 80-89 MM HG: CPT | Performed by: FAMILY MEDICINE

## 2024-12-12 ENCOUNTER — APPOINTMENT (OUTPATIENT)
Dept: HEMATOLOGY/ONCOLOGY | Facility: HOSPITAL | Age: 74
End: 2024-12-12
Attending: INTERNAL MEDICINE
Payer: COMMERCIAL

## 2024-12-17 ENCOUNTER — OFFICE VISIT (OUTPATIENT)
Age: 74
End: 2024-12-17
Attending: INTERNAL MEDICINE
Payer: COMMERCIAL

## 2024-12-17 VITALS
RESPIRATION RATE: 16 BRPM | SYSTOLIC BLOOD PRESSURE: 153 MMHG | HEART RATE: 101 BPM | WEIGHT: 177.38 LBS | OXYGEN SATURATION: 97 % | TEMPERATURE: 99 F | HEIGHT: 64 IN | DIASTOLIC BLOOD PRESSURE: 88 MMHG | BODY MASS INDEX: 30.28 KG/M2

## 2024-12-17 DIAGNOSIS — L76.82 AXILLARY WEB SYNDROME: ICD-10-CM

## 2024-12-17 DIAGNOSIS — Z79.811 ENCOUNTER FOR MONITORING AROMATASE INHIBITOR THERAPY: ICD-10-CM

## 2024-12-17 DIAGNOSIS — Z08 ENCOUNTER FOR FOLLOW-UP SURVEILLANCE OF BREAST CANCER: ICD-10-CM

## 2024-12-17 DIAGNOSIS — M25.552 PAIN OF LEFT HIP: ICD-10-CM

## 2024-12-17 DIAGNOSIS — Z17.0 MALIGNANT NEOPLASM OF OVERLAPPING SITES OF LEFT BREAST IN FEMALE, ESTROGEN RECEPTOR POSITIVE (HCC): Primary | ICD-10-CM

## 2024-12-17 DIAGNOSIS — Z78.0 OSTEOPENIA AFTER MENOPAUSE: ICD-10-CM

## 2024-12-17 DIAGNOSIS — M85.80 OSTEOPENIA AFTER MENOPAUSE: ICD-10-CM

## 2024-12-17 DIAGNOSIS — C50.812 MALIGNANT NEOPLASM OF OVERLAPPING SITES OF LEFT BREAST IN FEMALE, ESTROGEN RECEPTOR POSITIVE (HCC): Primary | ICD-10-CM

## 2024-12-17 DIAGNOSIS — L90.5 AXILLARY WEB SYNDROME: ICD-10-CM

## 2024-12-17 DIAGNOSIS — Z85.3 ENCOUNTER FOR FOLLOW-UP SURVEILLANCE OF BREAST CANCER: ICD-10-CM

## 2024-12-17 DIAGNOSIS — Z51.81 ENCOUNTER FOR MONITORING AROMATASE INHIBITOR THERAPY: ICD-10-CM

## 2024-12-17 NOTE — PROGRESS NOTES
HPI   Cynthia Larson is a 74 year old female here for follow up of   Encounter Diagnoses   Name Primary?    Malignant neoplasm of overlapping sites of left breast in female, estrogen receptor positive (HCC) Yes    Encounter for monitoring aromatase inhibitor therapy     Encounter for follow-up surveillance of breast cancer     Osteopenia after menopause     Axillary web syndrome         Compliance with SBE: Yes. Changes on SBE: No.     Compliance with letrozole:  compliance all of the time    Side effects from letrozole: No hot flashes, No arthralgias or myalgias.      Completed PT and most cording resolved.  Remaining cords but states no symptoms.  Pain is resolved.      She is taking vitamin D and calcium.    Having a knee replacement in February.    She had COVID in Sept of 2022 and has developed pain in her L hip/buttock since then.  States still having pain.  Pain goes away from motrin.  Not continuous pain.       ECOG PS 0    Review of Systems:   Review of Systems   Constitutional:  Negative for appetite change, fatigue and unexpected weight change.   Respiratory:  Positive for cough (from allergies). Negative for shortness of breath.    Cardiovascular:  Negative for chest pain.   Gastrointestinal:  Negative for abdominal pain.   Endocrine: Negative for hot flashes.   Musculoskeletal:  Positive for arthralgias (hands, states improved). Negative for back pain and neck pain.   Neurological:  Negative for dizziness and headaches.   Hematological:  Negative for adenopathy.   Psychiatric/Behavioral:  Positive for sleep disturbance (states from stress).             Current Outpatient Medications   Medication Sig Dispense Refill    amLODIPine 2.5 MG Oral Tab Take 1 tablet (2.5 mg total) by mouth daily. **Blood pressure follow up appointment needed for further refills. 30 tablet 0    metoprolol succinate ER 25 MG Oral Tablet 24 Hr Take 1 tablet (25 mg total) by mouth daily. 90 tablet 0    metFORMIN HCl 1000 MG  Oral Tab Take 1 tablet (1,000 mg total) by mouth daily with breakfast. 90 tablet 3    enalapril 20 MG Oral Tab Take 1 tablet (20 mg total) by mouth daily. 90 tablet 3    LETROZOLE 2.5 MG Oral Tab TAKE 1 TABLET BY MOUTH EVERY DAY 90 tablet 3    Cholecalciferol (VITAMIN D3) 25 MCG (1000 UT) Oral Cap Take 1 tablet by mouth daily.      Multiple Minerals-Vitamins (CALCIUM-MAGNESIUM-ZINC-D3 OR) Take by mouth daily. Calcium 500mg/Magnesium 80mg/Zinc 10mg/Vit D3 20mg      NON FORMULARY OSTEO BI-FLEX PO      LEVOTHYROXINE 112 MCG Oral Tab 1 tablet (112 mcg total).       Allergies:   Allergies   Allergen Reactions    Antihistamine & Nasal Deconges [Fexofenadine-Pseudoephedrine] OTHER (SEE COMMENTS)     Elevated BP    Sudafed [Pseudoephedrine] OTHER (SEE COMMENTS)     Elevated BP    Seasonal Coughing     Runny nose, Headache       Past Medical History:    Anesthesia complication    In  patient went into cardiac arrest after receiving the following anesthesia agents: Fentanyl, Propofol, Vecuronium, Remifentanil    Cancer (HCC)    Thyroid cancer     COVID    Sore throat cough headache, body aches. Nofever or residual    COVID-19 virus infection    Hyperlipidemia    Hypertension    Hypothyroidism    Prediabetes    Type 2 diabetes mellitus (HCC)     Past Surgical History:   Procedure Laterality Date    Anesth,surgery of shoulder Right 2016    Torn rotator cuff          Lumpectomy left      Lillie biopsy stereo nodule 1 site left (cpt=19081)      X-CLIP    Thyroidectomy       Social History     Socioeconomic History    Marital status:    Tobacco Use    Smoking status: Former     Types: Cigarettes    Smokeless tobacco: Never    Tobacco comments:     pt quit about 50 yrs ago    Vaping Use    Vaping status: Never Used   Substance and Sexual Activity    Alcohol use: Not Currently     Comment: occ holidays    Drug use: Never    Sexual activity: Not Currently     Partners: Male     Social Drivers of Health       [facility], [facility]    Social Connections    Received from [facility], [facility]    Housing Stability       Family History   Problem Relation Age of Onset    Breast Cancer Self 72    Colon Cancer Mother     Hypertension Mother     Diabetes Mother         type 2    Hypertension Father     Colon Cancer Maternal Aunt          PHYSICAL EXAM:    /88 (BP Location: Right arm, Patient Position: Sitting, Cuff Size: adult)   Pulse 101   Temp 98.5 °F (36.9 °C) (Oral)   Resp 16   Ht 1.626 m (5' 4\")   Wt 80.5 kg (177 lb 6.4 oz)   SpO2 97%   BMI 30.45 kg/m²   Wt Readings from Last 6 Encounters:   12/17/24 80.5 kg (177 lb 6.4 oz)   10/17/24 78.5 kg (173 lb)   09/19/24 78 kg (172 lb)   06/06/24 78.3 kg (172 lb 9.6 oz)   03/18/24 79.9 kg (176 lb 2 oz)   08/02/23 77.1 kg (170 lb)     Physical Exam  General: Patient is alert, not in acute distress.  HEENT: EOMs intact. PERRL.  Neck: No JVD. No palpable lymphadenopathy. Neck is supple.  Chest: Clear to auscultation.  Breasts:  R breast no masses, L breast with post operative changes and no masses. Cording on the left axilla but minimal.  Heart: Regular rate and rhythm.   Abdomen: Soft, non tender with good bowel sounds.  Extremities: No edema.  Neurological: Grossly intact.   Lymphatics: There is no palpable lymphadenopathy throughout in the cervical, supraclavicular, axillary, or inguinal regions.  Psych/Depression: nl        ASSESSMENT/PLAN:     1. Malignant neoplasm of overlapping sites of left breast in female, estrogen receptor positive (HCC)    2. Encounter for monitoring aromatase inhibitor therapy    3. Encounter for follow-up surveillance of breast cancer    4. Osteopenia after menopause    5. Axillary web syndrome       Cancer Staging   Malignant neoplasm of overlapping sites of left breast in female, estrogen receptor positive (HCC)  Staging form: Breast, AJCC 8th  Edition  - Clinical stage from 10/26/2022: Stage IA (cT1b, cN0, cM0, G1, ER+, NM+, HER2-) - Signed by Herber Hall MD on 10/26/2022  - Pathologic stage from 11/21/2022: Stage IA (pT1b, pN0(sn), cM0, G1, ER+, NM+, HER2-) - Signed by Herber Hall MD on 11/21/2022      Discussed with the patient the natural history of breast cancer.     Given her early stage, and her tumor biology, her prognosis is good.  Pathologic staging is the same as her clinical staging.    She has status post breast conservation with a lumpectomy.      Given her age being over 70, her tumor be strongly positive for estrogen and progesterone, and on imaging her primary tumor be less than 1 cm, she may not need radiation to her breast to complete local regional management, as long as she is compliant with at least 5 years of adjuvant hormonal therapy with an aromatase inhibitor.    Patient will complete 5 years of adjuvant hormonal therapy with letrozole and December 2027.      Baseline DEXA scan in March 2023 with osteopenia.  Vitamin D level ordered and will be drawn today, will replace if deficient.  Will have repeat DEXA in 2 years which will be March 2025.  Order given today.      Bilateral mammogram on 11/14/2024 BI-RADS 2.  This will be due again in 1 year which will be November 2025.    Axillary webbing:  Continue with HEP.    L hip/buttock pain:  L hip films and lumbar spine films.      MDM  moderate risk.    No orders of the defined types were placed in this encounter.      Results From Past 48 Hours:  No results found for this or any previous visit (from the past 48 hours).      Imaging & Referrals:  None   PROCEDURE: [facility] PREET 2D+3D DIAGNOSTIC WILLIAM BILAT (CPT=77066/35132)     COMPARISON: [facility], [facility] PREET 2D+3D DIAGNOSTIC WILLIAM LEFT (CPT=77065/34700), 5/18/2023, 8:02 AM.  [facility], [facility] PREET 2D+3D DIAGNOSTIC WILLIAM BILAT (EGZ=39388/84585), 11/07/2023, 10:44 AM.  [facility]  [facility] PREET 2D+3D DIAGNOSTIC WILLIAM LEFT (CPT=77065/81846), 5/23/2024, 9:28 AM.  [facility] BIOPSY STEREO W/PREET GUIDE 1 SITE LEFT (CPT=19081), 5/31/2024, 9:23 AM.     INDICATIONS: Malignant neoplasm of overlapping sites of left breast in female, estrogen receptor positive (HCC) C50.812 Malignant neoplasm of overlapping sites of lef*     TECHNIQUE: Digital diagnostic mammography was performed and images were reviewed with the [employer] 1.5.1.5 CAD device.  3D tomosynthesis was performed and reviewed.        BREAST COMPOSITION:   Category b-Scattered areas fibroglandular density.        FINDINGS: Surgical changes are seen in the left breast.  There is a biopsy clip in the left breast.  There is no suspicious asymmetry, mass, architectural distortion, or microcalcifications identified in either breast.                   Impression  CONCLUSION:   There is no mammographic evidence of malignancy in either breast. As long as patient's clinical breast exam remains unchanged, annual screening mammogram is recommended.     BI-RADS CATEGORY:    DIAGNOSTIC CATEGORY 2 - BENIGN FINDING:       RECOMMENDATIONS:  ROUTINE MAMMOGRAM AND CLINICAL EVALUATION IN 12 MONTHS.               PLEASE NOTE: NORMAL MAMMOGRAM DOES NOT EXCLUDE THE POSSIBILITY OF BREAST CANCER.  A CLINICALLY SUSPICIOUS PALPABLE LUMP SHOULD BE BIOPSIED.       For patients over the age of 40, the target due date for the patient's next mammogram has been entered into a reminder system.       Patient received a discharge summary from the technologist after completion of exam.     Breast marker legend used on images    Triangle = Palpable lump  Torreon = Skin tag or mole  BB = Nipple  Linear jerzy = Scar  Square = Pain           Dictated by (CST): Wendy Tucker DO on 11/14/2024 at 10:59 AM      Finalized by (CST): Wendy Tucker DO on 11/14/2024 at 11:03 AM          [facility]  [address]  [place] [address]  [phone]

## 2024-12-19 DIAGNOSIS — I10 ESSENTIAL HYPERTENSION: ICD-10-CM

## 2024-12-24 NOTE — TELEPHONE ENCOUNTER
Please review.  Protocol failed / No protocol.    Requested Prescriptions   Pending Prescriptions Disp Refills    amLODIPine 2.5 MG Oral Tab 90 tablet 1     Sig: Take 1 tablet (2.5 mg total) by mouth daily. **Blood pressure follow up appointment needed for further refills.       Hypertension Medications Protocol Failed - 12/24/2024  4:53 PM        Failed - Last BP reading less than 140/90     BP Readings from Last 1 Encounters:   12/17/24 153/88               Passed - CMP or BMP in past 12 months        Passed - In person appointment or virtual visit in the past 12 mos or appointment in next 3 mos     Recent Outpatient Visits              1 week ago Malignant neoplasm of overlapping sites of left breast in female, estrogen receptor positive (HCC)    [facility] Hematology Oncology [place] Herber Hall MD    Office Visit    2 weeks ago     [facility]    Nurse Only    2 months ago Essential hypertension    [facility] Sara Loera MD    Office Visit    3 months ago Type 2 diabetes mellitus without complication, without long-term current use of insulin (HCC)    [facility]    Nurse Only    3 months ago Encounter for general adult medical examination w/o abnormal findings    [facility] Sara Loera MD    Office Visit          Future Appointments         Provider Department Appt Notes    In 5 months Herber Hall MD [facility] Hematology Oncology [place] FOLLOW UP VISIT.CL, 6M                    Passed - EGFRCR or GFRNAA > 50     GFR Evaluation  EGFRCR: 93 , resulted on 9/19/2024               Future Appointments         Provider Department Appt Notes    In 5 months Herber Hall MD [facility] Hematology Oncology [place] FOLLOW UP  VISIT.CL, 6M            Recent Outpatient Visits              1 week ago Malignant neoplasm of overlapping sites of left breast in female, estrogen receptor positive (HCC)    [facility] Hematology Oncology Herber Marie MD    Office Visit    2 weeks ago     [facility]    Nurse Only    2 months ago Essential hypertension    [facility] Sara Loera MD    Office Visit    3 months ago Type 2 diabetes mellitus without complication, without long-term current use of insulin (HCC)    [facility]    Nurse Only    3 months ago Encounter for general adult medical examination w/o abnormal findings    [facility] Sara Ortiz MD    Office Visit

## 2024-12-25 RX ORDER — AMLODIPINE BESYLATE 2.5 MG/1
2.5 TABLET ORAL DAILY
Qty: 90 TABLET | Refills: 1 | Status: SHIPPED | OUTPATIENT
Start: 2024-12-25

## 2025-02-06 ENCOUNTER — EKG ENCOUNTER (OUTPATIENT)
Dept: LAB | Age: 75
End: 2025-02-06
Attending: FAMILY MEDICINE
Payer: COMMERCIAL

## 2025-02-06 ENCOUNTER — LAB ENCOUNTER (OUTPATIENT)
Dept: LAB | Facility: REFERENCE LAB | Age: 75
End: 2025-02-06
Attending: FAMILY MEDICINE
Payer: COMMERCIAL

## 2025-02-06 ENCOUNTER — OFFICE VISIT (OUTPATIENT)
Facility: CLINIC | Age: 75
End: 2025-02-06
Payer: COMMERCIAL

## 2025-02-06 VITALS
HEIGHT: 64 IN | HEART RATE: 90 BPM | DIASTOLIC BLOOD PRESSURE: 72 MMHG | WEIGHT: 174 LBS | OXYGEN SATURATION: 99 % | SYSTOLIC BLOOD PRESSURE: 150 MMHG | BODY MASS INDEX: 29.71 KG/M2

## 2025-02-06 DIAGNOSIS — Z01.818 PREOP EXAMINATION: ICD-10-CM

## 2025-02-06 DIAGNOSIS — I10 ESSENTIAL HYPERTENSION: Primary | ICD-10-CM

## 2025-02-06 DIAGNOSIS — M25.561 CHRONIC PAIN OF RIGHT KNEE: ICD-10-CM

## 2025-02-06 DIAGNOSIS — G89.29 CHRONIC PAIN OF RIGHT KNEE: ICD-10-CM

## 2025-02-06 LAB
ALBUMIN SERPL-MCNC: 4.4 G/DL (ref 3.2–4.8)
ALBUMIN/GLOB SERPL: 1.6 {RATIO} (ref 1–2)
ALP LIVER SERPL-CCNC: 89 U/L
ALT SERPL-CCNC: 16 U/L
ANION GAP SERPL CALC-SCNC: 7 MMOL/L (ref 0–18)
AST SERPL-CCNC: 20 U/L (ref ?–34)
ATRIAL RATE: 81 BPM
BASOPHILS # BLD AUTO: 0.07 X10(3) UL (ref 0–0.2)
BASOPHILS NFR BLD AUTO: 1.4 %
BILIRUB SERPL-MCNC: 1.1 MG/DL (ref 0.2–1.1)
BUN BLD-MCNC: 11 MG/DL (ref 9–23)
BUN/CREAT SERPL: 16.9 (ref 10–20)
CALCIUM BLD-MCNC: 9.9 MG/DL (ref 8.7–10.4)
CHLORIDE SERPL-SCNC: 102 MMOL/L (ref 98–112)
CO2 SERPL-SCNC: 31 MMOL/L (ref 21–32)
CREAT BLD-MCNC: 0.65 MG/DL
DEPRECATED RDW RBC AUTO: 43.5 FL (ref 35.1–46.3)
EGFRCR SERPLBLD CKD-EPI 2021: 92 ML/MIN/1.73M2 (ref 60–?)
EOSINOPHIL # BLD AUTO: 0.24 X10(3) UL (ref 0–0.7)
EOSINOPHIL NFR BLD AUTO: 4.7 %
ERYTHROCYTE [DISTWIDTH] IN BLOOD BY AUTOMATED COUNT: 13.7 % (ref 11–15)
EST. AVERAGE GLUCOSE BLD GHB EST-MCNC: 137 MG/DL (ref 68–126)
FASTING STATUS PATIENT QL REPORTED: YES
GLOBULIN PLAS-MCNC: 2.8 G/DL (ref 2–3.5)
GLUCOSE BLD-MCNC: 123 MG/DL (ref 70–99)
HBA1C MFR BLD: 6.4 % (ref ?–5.7)
HCT VFR BLD AUTO: 40.9 %
HGB BLD-MCNC: 12.5 G/DL
IMM GRANULOCYTES # BLD AUTO: 0.02 X10(3) UL (ref 0–1)
IMM GRANULOCYTES NFR BLD: 0.4 %
LYMPHOCYTES # BLD AUTO: 1.4 X10(3) UL (ref 1–4)
LYMPHOCYTES NFR BLD AUTO: 27.6 %
MCH RBC QN AUTO: 26.4 PG (ref 26–34)
MCHC RBC AUTO-ENTMCNC: 30.6 G/DL (ref 31–37)
MCV RBC AUTO: 86.5 FL
MONOCYTES # BLD AUTO: 0.41 X10(3) UL (ref 0.1–1)
MONOCYTES NFR BLD AUTO: 8.1 %
NEUTROPHILS # BLD AUTO: 2.93 X10 (3) UL (ref 1.5–7.7)
NEUTROPHILS # BLD AUTO: 2.93 X10(3) UL (ref 1.5–7.7)
NEUTROPHILS NFR BLD AUTO: 57.8 %
OSMOLALITY SERPL CALC.SUM OF ELEC: 291 MOSM/KG (ref 275–295)
P AXIS: 63 DEGREES
P-R INTERVAL: 146 MS
PLATELET # BLD AUTO: 322 10(3)UL (ref 150–450)
POTASSIUM SERPL-SCNC: 4.2 MMOL/L (ref 3.5–5.1)
PROT SERPL-MCNC: 7.2 G/DL (ref 5.7–8.2)
Q-T INTERVAL: 400 MS
QRS DURATION: 136 MS
QTC CALCULATION (BEZET): 464 MS
R AXIS: -40 DEGREES
RBC # BLD AUTO: 4.73 X10(6)UL
SODIUM SERPL-SCNC: 140 MMOL/L (ref 136–145)
T AXIS: 87 DEGREES
VENTRICULAR RATE: 81 BPM
WBC # BLD AUTO: 5.1 X10(3) UL (ref 4–11)

## 2025-02-06 PROCEDURE — 85025 COMPLETE CBC W/AUTO DIFF WBC: CPT | Performed by: FAMILY MEDICINE

## 2025-02-06 PROCEDURE — 83036 HEMOGLOBIN GLYCOSYLATED A1C: CPT | Performed by: FAMILY MEDICINE

## 2025-02-06 PROCEDURE — 3008F BODY MASS INDEX DOCD: CPT | Performed by: FAMILY MEDICINE

## 2025-02-06 PROCEDURE — 3078F DIAST BP <80 MM HG: CPT | Performed by: FAMILY MEDICINE

## 2025-02-06 PROCEDURE — 80053 COMPREHEN METABOLIC PANEL: CPT | Performed by: FAMILY MEDICINE

## 2025-02-06 PROCEDURE — 3077F SYST BP >= 140 MM HG: CPT | Performed by: FAMILY MEDICINE

## 2025-02-06 PROCEDURE — 99214 OFFICE O/P EST MOD 30 MIN: CPT | Performed by: FAMILY MEDICINE

## 2025-02-06 RX ORDER — AMLODIPINE BESYLATE 5 MG/1
5 TABLET ORAL DAILY
Qty: 90 TABLET | Refills: 3 | Status: SHIPPED | OUTPATIENT
Start: 2025-02-06 | End: 2026-02-01

## 2025-02-06 NOTE — PROGRESS NOTES
Subjective:   Cynthia Larson is a 74 year old female who presents for Pre-Op Exam (For Knee replacement )     HTN  Currently on metoprolol ER 25 mg, enalapril 20 mg and at previous visit added on amlodipine 5 mg. Patient is doing since addition of amlodipine. Is taking metoprolol and enalapril in the AM and amlodipine in PM. Blood pressure systolics have decreased from the 170s systolic to 150s systolic.     Right knee osteoarthritis  Patient also presents for preoperative clearance for upcoming right knee arthroplasty with Dr. Heron Saunders on 2/25/2025. Of note patient did have complication with anesthesia in the past. Went into afib and subsquent cardiac arrest. Per chart review agents administered were: Fentanyl, Propofol, Vecuronium, Remifentanil. This was following thyrpid surgery for thyroid cancer. Has had subsequent surgeries including shoulder and breast under anesthesia without issue avoiding causative anesthesia agent. Patient also has known LBBB. Was seen by cardiology in 2012 who did recommend any further workup. Patient continues to do well from cardiac standpoint. No chest pain. No shortness of breath. Able to walk 2 miles a day 4 days a week without issue aside from knee pain.       Fax [phone]     History/Other:    Chief Complaint Reviewed and Verified  Nursing Notes Reviewed and   Verified  Tobacco Reviewed  Allergies Reviewed  Medications Reviewed    Problem List Reviewed  Medical History Reviewed  Surgical History   Reviewed  OB Status Reviewed  Family History Reviewed  Social History   Reviewed         Tobacco:  She smoked tobacco in the past but quit greater than 12 months ago.  Social History     Tobacco Use   Smoking Status Former    Types: Cigarettes   Smokeless Tobacco Never   Tobacco Comments    pt quit about 50 yrs ago         Current Outpatient Medications   Medication Sig Dispense Refill    amLODIPine 5 MG Oral Tab Take 1 tablet (5 mg total) by mouth daily. 90 tablet  3    amLODIPine 2.5 MG Oral Tab Take 1 tablet (2.5 mg total) by mouth daily. **Blood pressure follow up appointment needed for further refills. 90 tablet 1    metoprolol succinate ER 25 MG Oral Tablet 24 Hr Take 1 tablet (25 mg total) by mouth daily. 90 tablet 0    metFORMIN HCl 1000 MG Oral Tab Take 1 tablet (1,000 mg total) by mouth daily with breakfast. 90 tablet 3    enalapril 20 MG Oral Tab Take 1 tablet (20 mg total) by mouth daily. 90 tablet 3    LETROZOLE 2.5 MG Oral Tab TAKE 1 TABLET BY MOUTH EVERY DAY 90 tablet 3    Cholecalciferol (VITAMIN D3) 25 MCG (1000 UT) Oral Cap Take 1 tablet by mouth daily.      Multiple Minerals-Vitamins (CALCIUM-MAGNESIUM-ZINC-D3 OR) Take by mouth daily. Calcium 500mg/Magnesium 80mg/Zinc 10mg/Vit D3 20mg      NON FORMULARY OSTEO BI-FLEX PO      LEVOTHYROXINE 112 MCG Oral Tab 1 tablet (112 mcg total).           Review of Systems:  Review of Systems   Constitutional: Negative.    Respiratory: Negative.     Cardiovascular: Negative.    Gastrointestinal: Negative.    Musculoskeletal:  Positive for arthralgias (chronic right knee pain).   Skin: Negative.    Neurological: Negative.          Objective:   /72   Pulse 90   Ht 5' 4\" (1.626 m)   Wt 174 lb (78.9 kg)   SpO2 99%   BMI 29.87 kg/m²  Estimated body mass index is 29.87 kg/m² as calculated from the following:    Height as of this encounter: 5' 4\" (1.626 m).    Weight as of this encounter: 174 lb (78.9 kg).  Physical Exam  Vitals and nursing note reviewed.   Constitutional:       General: She is not in acute distress.     Appearance: Normal appearance.   HENT:      Head: Normocephalic and atraumatic.      Right Ear: Tympanic membrane normal.      Left Ear: Tympanic membrane normal.   Eyes:      General:         Right eye: No discharge.         Left eye: No discharge.      Comments: Extraocular eye movements grossly intact   Cardiovascular:      Rate and Rhythm: Normal rate and regular rhythm.      Heart sounds: Normal  heart sounds. No murmur heard.     No friction rub. No gallop.   Pulmonary:      Effort: Pulmonary effort is normal. No respiratory distress.      Breath sounds: Normal breath sounds. No stridor. No wheezing, rhonchi or rales.   Abdominal:      General: Abdomen is flat. Bowel sounds are normal. There is no distension.      Palpations: Abdomen is soft. There is no mass.      Tenderness: There is no abdominal tenderness. There is no guarding or rebound.      Hernia: No hernia is present.   Musculoskeletal:      Comments: Normal gait   Skin:     Findings: No rash.   Neurological:      General: No focal deficit present.      Mental Status: She is alert. Mental status is at baseline.   Psychiatric:         Mood and Affect: Mood normal.         Behavior: Behavior normal.         Assessment & Plan:   1. Essential hypertension (Primary)  -     amLODIPine Besylate; Take 1 tablet (5 mg total) by mouth daily.  Dispense: 90 tablet; Refill: 3    Not well controlled. Increased amlodipine from 2.5 mg to 5 mg. Counseled patient to get home cuff and send provider home readings.     2. Chronic pain of right knee  3. Preop examination  -     Hemoglobin A1C; Future; Expected date: 02/06/2025  -     CBC With Differential With Platelet; Future; Expected date: 02/06/2025  -     Comp Metabolic Panel (14); Future; Expected date: 02/06/2025  -     EKG 12 Lead; Future; Expected date: 02/06/2025    -ordered labs as requested by surgeon. Per ACS NSQIP surgical risk calculator patient is at below average risk for any complication and at below average risk for serious complications with risk percentiles of 3.1 % and 2.5 % respectively. Patient is clear to proceed with surgery with surgeon acknowledgement of risks as stated. Given history of serious anesthesia complication would recommend patient meet with anesthesia team prior to surgery to ensure appropriate anesthesia used in patient      Return in about 2 months (around 4/6/2025) for  HTN.    Sara Loera MD, 2/6/2025, 9:41 AM

## 2025-02-25 RX ORDER — METOPROLOL SUCCINATE 25 MG/1
25 TABLET, EXTENDED RELEASE ORAL DAILY
Qty: 90 TABLET | Refills: 3 | Status: SHIPPED | OUTPATIENT
Start: 2025-02-25

## 2025-02-25 NOTE — TELEPHONE ENCOUNTER
Please Review. Protocol Failed; No Protocol     BP Readings from Last 1 Encounters:   02/06/25 150/72

## 2025-06-19 ENCOUNTER — HOSPITAL ENCOUNTER (OUTPATIENT)
Dept: GENERAL RADIOLOGY | Facility: HOSPITAL | Age: 75
Discharge: HOME | End: 2025-06-19
Attending: INTERNAL MEDICINE
Payer: COMMERCIAL

## 2025-06-19 ENCOUNTER — OFFICE VISIT (OUTPATIENT)
Age: 75
End: 2025-06-19
Attending: INTERNAL MEDICINE
Payer: COMMERCIAL

## 2025-06-19 VITALS
SYSTOLIC BLOOD PRESSURE: 132 MMHG | OXYGEN SATURATION: 96 % | HEIGHT: 64 IN | TEMPERATURE: 99 F | DIASTOLIC BLOOD PRESSURE: 84 MMHG | HEART RATE: 116 BPM | BODY MASS INDEX: 29.71 KG/M2 | RESPIRATION RATE: 16 BRPM | WEIGHT: 174 LBS

## 2025-06-19 DIAGNOSIS — C50.812 MALIGNANT NEOPLASM OF OVERLAPPING SITES OF LEFT BREAST IN FEMALE, ESTROGEN RECEPTOR POSITIVE (HCC): Primary | ICD-10-CM

## 2025-06-19 DIAGNOSIS — L76.82 AXILLARY WEB SYNDROME: ICD-10-CM

## 2025-06-19 DIAGNOSIS — Z85.3 ENCOUNTER FOR FOLLOW-UP SURVEILLANCE OF BREAST CANCER: ICD-10-CM

## 2025-06-19 DIAGNOSIS — Z12.31 SCREENING MAMMOGRAM FOR BREAST CANCER: ICD-10-CM

## 2025-06-19 DIAGNOSIS — M25.552 PAIN OF LEFT HIP: ICD-10-CM

## 2025-06-19 DIAGNOSIS — M85.80 OSTEOPENIA AFTER MENOPAUSE: ICD-10-CM

## 2025-06-19 DIAGNOSIS — Z17.0 MALIGNANT NEOPLASM OF OVERLAPPING SITES OF LEFT BREAST IN FEMALE, ESTROGEN RECEPTOR POSITIVE (HCC): Primary | ICD-10-CM

## 2025-06-19 DIAGNOSIS — L90.5 AXILLARY WEB SYNDROME: ICD-10-CM

## 2025-06-19 DIAGNOSIS — Z78.0 OSTEOPENIA AFTER MENOPAUSE: ICD-10-CM

## 2025-06-19 DIAGNOSIS — Z08 ENCOUNTER FOR FOLLOW-UP SURVEILLANCE OF BREAST CANCER: ICD-10-CM

## 2025-06-19 DIAGNOSIS — Z51.81 ENCOUNTER FOR MONITORING AROMATASE INHIBITOR THERAPY: ICD-10-CM

## 2025-06-19 DIAGNOSIS — Z79.811 ENCOUNTER FOR MONITORING AROMATASE INHIBITOR THERAPY: ICD-10-CM

## 2025-06-19 PROCEDURE — 73502 X-RAY EXAM HIP UNI 2-3 VIEWS: CPT | Performed by: INTERNAL MEDICINE

## 2025-06-19 PROCEDURE — 72110 X-RAY EXAM L-2 SPINE 4/>VWS: CPT | Performed by: INTERNAL MEDICINE

## 2025-06-19 NOTE — PROGRESS NOTES
HPI   Cynthia Larson is a 74 year old female here for follow up of   Encounter Diagnoses   Name Primary?    Malignant neoplasm of overlapping sites of left breast in female, estrogen receptor positive (HCC) Yes    Encounter for monitoring aromatase inhibitor therapy     Encounter for follow-up surveillance of breast cancer     Osteopenia after menopause     Axillary web syndrome         Compliance with SBE: Yes. Changes on SBE: No.     Compliance with letrozole:  compliance all of the time    Side effects from letrozole: No hot flashes, No arthralgias or myalgias.      Completed PT and most cording resolved.  Remaining cords but states no symptoms.  Pain is resolved.      She is taking vitamin D and calcium.    Had R knee replaced.  Back to work full time.  Now over 3 weeks, L hip pain and LBP, taking ibuprofen and icing.  Same complaint last visit but had improved. She is on PT.        ECOG PS 0    Review of Systems:   Review of Systems   Constitutional:  Negative for appetite change, fatigue and unexpected weight change.   Respiratory:  Positive for cough (from allergies, improved with claritin). Negative for shortness of breath.    Cardiovascular:  Negative for chest pain.   Gastrointestinal:  Negative for abdominal pain.   Endocrine: Negative for hot flashes.   Musculoskeletal:  Positive for arthralgias (hands, states improved). Negative for back pain and neck pain.   Neurological:  Negative for dizziness and headaches.   Hematological:  Negative for adenopathy.   Psychiatric/Behavioral:  Positive for sleep disturbance (states from stress and pain in the L hip and back).             Current Outpatient Medications   Medication Sig Dispense Refill    metoprolol succinate ER 25 MG Oral Tablet 24 Hr Take 1 tablet (25 mg total) by mouth daily. 90 tablet 3    amLODIPine 5 MG Oral Tab Take 1 tablet (5 mg total) by mouth daily. 90 tablet 3    metFORMIN HCl 1000 MG Oral Tab Take 1 tablet (1,000 mg total) by mouth  daily with breakfast. 90 tablet 3    enalapril 20 MG Oral Tab Take 1 tablet (20 mg total) by mouth daily. 90 tablet 3    LETROZOLE 2.5 MG Oral Tab TAKE 1 TABLET BY MOUTH EVERY DAY 90 tablet 3    Cholecalciferol (VITAMIN D3) 25 MCG (1000 UT) Oral Cap Take 1 tablet by mouth in the morning.      Multiple Minerals-Vitamins (CALCIUM-MAGNESIUM-ZINC-D3 OR) Take by mouth in the morning. Calcium 500mg/Magnesium 80mg/Zinc 10mg/Vit D3 20mg.      NON FORMULARY OSTEO BI-FLEX PO      LEVOTHYROXINE 112 MCG Oral Tab 1 tablet (112 mcg total).       Allergies:   Allergies   Allergen Reactions    Antihistamine & Nasal Deconges [Fexofenadine-Pseudoephedrine] OTHER (SEE COMMENTS)     Elevated BP    Sudafed [Pseudoephedrine] OTHER (SEE COMMENTS)     Elevated BP    Seasonal Coughing     Runny nose, Headache       Past Medical History:    Anesthesia complication    In  patient went into cardiac arrest after receiving the following anesthesia agents: Fentanyl, Propofol, Vecuronium, Remifentanil    Cancer (HCC)    Thyroid cancer     COVID    Sore throat cough headache, body aches. Nofever or residual    COVID-19 virus infection    Hyperlipidemia    Hypertension    Hypothyroidism    Prediabetes    Type 2 diabetes mellitus (HCC)     Past Surgical History:   Procedure Laterality Date    Anesth,surgery of shoulder Right 2016    Torn rotator cuff      1988    Lumpectomy left      Lillie biopsy stereo nodule 1 site left (cpt=19081)      X-CLIP    Thyroidectomy       Social History     Socioeconomic History    Marital status:    Tobacco Use    Smoking status: Former     Types: Cigarettes    Smokeless tobacco: Never    Tobacco comments:     pt quit about 50 yrs ago    Vaping Use    Vaping status: Never Used   Substance and Sexual Activity    Alcohol use: Not Currently     Comment: occ holidays    Drug use: Never    Sexual activity: Not Currently     Partners: Male     Social Drivers of Health     Food Insecurity: No Food  Insecurity (2/27/2025)    Received from [facility]    Food Insecurity     Currently or in the past 3 months, have you worried your food would run out before you had money to buy more?: No     In the past 12 months, have you run out of food or been unable to get more?: No   Transportation Needs: No Transportation Needs (2/27/2025)    Received from [facility]    Transportation Needs     Currently or in the past 3 months, has lack of transportation kept you from medical appointments, getting food or medicine, or providing care to a family member?: No     Medical Transportation Needs?: No    Received from [facility]    Housing Stability       Family History   Problem Relation Age of Onset    Breast Cancer Self 72    Colon Cancer Mother     Hypertension Mother     Diabetes Mother         type 2    Hypertension Father     Colon Cancer Maternal Aunt          PHYSICAL EXAM:    /84 (BP Location: Left arm, Patient Position: Sitting, Cuff Size: adult)   Pulse 116   Temp 98.5 °F (36.9 °C) (Tympanic)   Resp 16   Ht 1.626 m (5' 4\")   Wt 78.9 kg (174 lb)   SpO2 96%   BMI 29.87 kg/m²   Wt Readings from Last 6 Encounters:   06/19/25 78.9 kg (174 lb)   02/06/25 78.9 kg (174 lb)   12/17/24 80.5 kg (177 lb 6.4 oz)   10/17/24 78.5 kg (173 lb)   09/19/24 78 kg (172 lb)   06/06/24 78.3 kg (172 lb 9.6 oz)     Physical Exam  General: Patient is alert, not in acute distress.  HEENT: EOMs intact. PERRL.  Neck: No JVD. No palpable lymphadenopathy. Neck is supple.  Chest: Clear to auscultation.  Breasts:  R breast no masses, L breast with post operative changes and no masses. No cording on the left axilla resolved.  Heart: Regular rate and rhythm.   Abdomen: Soft, non tender with good bowel sounds.  Extremities: No edema.  Neurological: Grossly intact.   Lymphatics: There is no palpable lymphadenopathy throughout in the cervical, supraclavicular, axillary, or inguinal  regions.  Psych/Depression: nl        ASSESSMENT/PLAN:     1. Malignant neoplasm of overlapping sites of left breast in female, estrogen receptor positive (HCC)    2. Encounter for monitoring aromatase inhibitor therapy    3. Encounter for follow-up surveillance of breast cancer    4. Osteopenia after menopause    5. Axillary web syndrome       Cancer Staging   Malignant neoplasm of overlapping sites of left breast in female, estrogen receptor positive (HCC)  Staging form: Breast, AJCC 8th Edition  - Clinical stage from 10/26/2022: Stage IA (cT1b, cN0, cM0, G1, ER+, DE+, HER2-) - Signed by Herber Hall MD on 10/26/2022  - Pathologic stage from 11/21/2022: Stage IA (pT1b, pN0(sn), cM0, G1, ER+, DE+, HER2-) - Signed by Herber Hall MD on 11/21/2022      Discussed with the patient the natural history of breast cancer.     Given her early stage, and her tumor biology, her prognosis is good.  Pathologic staging is the same as her clinical staging.    She has status post breast conservation with a lumpectomy.      Given her age being over 70, her tumor be strongly positive for estrogen and progesterone, and on imaging her primary tumor be less than 1 cm, she may not need radiation to her breast to complete local regional management, as long as she is compliant with at least 5 years of adjuvant hormonal therapy with an aromatase inhibitor.    Patient will complete 5 years of adjuvant hormonal therapy with letrozole in December 2027.      Baseline DEXA scan in March 2023 with osteopenia.  Vitamin D level ordered and will be drawn today, will replace if deficient.  Will have repeat DEXA in 2 years which will be March 2025. Patient to complete at their earliest convenience.      Bilateral mammogram on 11/14/2024 BI-RADS 2.  This will be due again in 1 year which will be November 2025.    Axillary webbing:  Continue with HEP.    LBP and L hip pain:  proceed with Xrays ordered 6 months ago.       MDM  moderate risk.  I  have a longitudinal and continuous care relationship with this patient for the management of breast cancer, a serious or complex condition.  is applicable because the patient's medical record notes over time support that there is a longitudinal care relationship with me, the care plan reflects the ongoing nature of the continuous relationship of care, and the medical record indicates that there is ongoing treatment of a serious/complex medical condition which I am currently managing.     No orders of the defined types were placed in this encounter.      Results From Past 48 Hours:  No results found for this or any previous visit (from the past 48 hours).      Imaging & Referrals:  None   PROCEDURE: [facility] PREET 2D+3D DIAGNOSTIC WILLIAM BILAT (CPT=77066/62690)     COMPARISON: [facility], [facility] PREET 2D+3D DIAGNOSTIC WILLIAM LEFT (CPT=77065/21877), 5/18/2023, 8:02 AM.  [facility] PREET 2D+3D DIAGNOSTIC WILLIAM BILAT (PIN=[id number]), 11/07/2023, 10:44 AM.  [facility] PREET 2D+3D DIAGNOSTIC WILLIAM LEFT (CPT=77065/26457), 5/23/2024, 9:28 AM.  [facility], [facility] BIOPSY STEREO W/PREET GUIDE 1 SITE LEFT (CPT=19081), 5/31/2024, 9:23 AM.     INDICATIONS: Malignant neoplasm of overlapping sites of left breast in female, estrogen receptor positive (HCC) C50.812 Malignant neoplasm of overlapping sites of lef*     TECHNIQUE: Digital diagnostic mammography was performed and images were reviewed with the [employer] ROBINSON 1.5.1.5 CAD device.  3D tomosynthesis was performed and reviewed.        BREAST COMPOSITION:   Category b-Scattered areas fibroglandular density.        FINDINGS: Surgical changes are seen in the left breast.  There is a biopsy clip in the left breast.  There is no suspicious asymmetry, mass, architectural distortion, or microcalcifications identified in either breast.                   Impression  CONCLUSION:   There is no mammographic  evidence of malignancy in either breast. As long as patient's clinical breast exam remains unchanged, annual screening mammogram is recommended.     BI-RADS CATEGORY:    DIAGNOSTIC CATEGORY 2 - BENIGN FINDING:       RECOMMENDATIONS:  ROUTINE MAMMOGRAM AND CLINICAL EVALUATION IN 12 MONTHS.               PLEASE NOTE: NORMAL MAMMOGRAM DOES NOT EXCLUDE THE POSSIBILITY OF BREAST CANCER.  A CLINICALLY SUSPICIOUS PALPABLE LUMP SHOULD BE BIOPSIED.       For patients over the age of 40, the target due date for the patient's next mammogram has been entered into a reminder system.       Patient received a discharge summary from the technologist after completion of exam.     Breast marker legend used on images    Triangle = Palpable lump  Grelton = Skin tag or mole  BB = Nipple  Linear jerzy = Scar  Square = Pain           Dictated by (CST): Wendy Tucker DO on 11/14/2024 at 10:59 AM      Finalized by (CST): Wendy Tucker DO on 11/14/2024 at 11:03 AM          [address] [place], [place] [address]  [phone]

## (undated) DEVICE — BRA SURGICAL ELIZABETH PINK L

## (undated) DEVICE — #15 STERILE STAINLESS BLADE: Brand: STERILE STAINLESS BLADES

## (undated) DEVICE — SLEEVE KENDALL SCD EXPRESS MED

## (undated) DEVICE — SUT VICRYL 3-0 SH J416H

## (undated) DEVICE — PROVE COVER: Brand: UNBRANDED

## (undated) DEVICE — CLEAR MONOFILAMENT (POLYDIOXANONE), ABSORBABLE SURGICAL SUTURE: Brand: PDS

## (undated) DEVICE — STANDARD HYPODERMIC NEEDLE,POLYPROPYLENE HUB: Brand: MONOJECT

## (undated) DEVICE — DRAPE PACK CHEST & U BAR

## (undated) DEVICE — Device

## (undated) DEVICE — HEMOCLIP HORIZON SM 001200

## (undated) DEVICE — TOWEL SURG OR 17X30IN BLUE

## (undated) DEVICE — BREAST-HERNIA-PORT CDS-LF: Brand: MEDLINE INDUSTRIES, INC.

## (undated) DEVICE — SUT MONOCRYL 4-0 PS-2 Y496G

## (undated) DEVICE — STERILE POLYISOPRENE POWDER-FREE SURGICAL GLOVES: Brand: PROTEXIS

## (undated) DEVICE — LIGHT HANDLE

## (undated) DEVICE — DRAPE,TAPE STRIPS,STERILE: Brand: MEDLINE

## (undated) DEVICE — UNDERPAD 23X36 LIGHT CHUX

## (undated) DEVICE — HEMOCLIP HORIZON MED 002200

## (undated) DEVICE — SUT SILK 2-0 FS 685G

## (undated) DEVICE — MEGADYNE E-Z CLEAN BLADE 2.75"

## (undated) DEVICE — SOLUTION  .9 1000ML BTL

## (undated) DEVICE — 3M™ STERI-DRAPE™ INSTRUMENT POUCH 1018: Brand: STERI-DRAPE™

## (undated) NOTE — LETTER
Patient Name: Candida Reid CSN: [id number]  -Age / Sex: 10/28/1950-A: 67 y  female Medical Records: [id number]    The above patient had a positive COVID test on: ____________      Per [facility] Infection Control guidelines this patient will NOT be retested for COVID  prior to their surgery/procedure on: ______________. Thank you.

## (undated) NOTE — LETTER
[facility]  [address] [address]  Authorization for Imaging Procedure  Date of Procedure:     I hereby authorize Dr. ROA, my physician and his/her assistants (if applicable), which may include medical students, residents, and/or fellows, to perform the following procedure and administer such anesthesia as may be determined necessary by my physician: STEREOTACTIC GUIDED BIOPSY WITH TOMOSYNTHESIS OF LEFT BREAST WITH CLIP PLACEMENT on Cynthia Larson.   2.  I recognize that during the procedure, unforeseen conditions may necessitate additional or different procedures than those listed above. I, therefore, further authorize and request that the above-named physician, assistants, or designees perform such procedures as are, in their judgment, necessary and desirable.    3.  My physician has discussed prior to my procedure the potential benefits, risks and side effects of this procedure; the likelihood of achieving goals; and potential problems that might occur during recuperation. They also discussed reasonable alternatives to the procedure, including risks, benefits, and side effects related to the alternatives and risks related to not receiving this procedure. I have had all my questions answered and I acknowledge that no guarantee has been made as to the result that may be obtained.    4.  Should the need arise during my procedure, which includes change of level of care prior to discharge, I also consent to the administration of blood and/or blood products. Further, I understand that despite careful testing and screening of blood or blood products by collecting agencies, I may still be subject to ill effects as a result of receiving a blood transfusion and/or blood products. The following are some, but not all, of the potential risks that can occur: fever and allergic reactions, hemolytic reactions, transmission of diseases such as  Hepatitis, AIDS and Cytomegalovirus (CMV) and fluid overload. In the event that I wish to have an autologous transfusion of my own blood, or a directed donor transfusion, I will discuss this with my physician.  Check only if Refusing Blood or Blood Products  I understand refusal of blood or blood products as deemed necessary by my physician may have serious consequences to my condition to include possible death. I hereby assume responsibility for my refusal and release the hospital, its personnel, and my physicians from any responsibility for the consequences of my refusal.   [  ] Patient Refuses Blood      5.  I authorize the use of any specimen, organs, tissues, body parts or foreign objects that may be removed from my body during the procedure for diagnosis, research or teaching purposes and their subsequent disposal by hospital authorities. I also authorize the release of specimen test results and/or written reports to my treating physician on the hospital medical staff or other referring or consulting physicians involved in my care, at the discretion of the Pathologist or my treating physician.    6.  I consent to the photographing or videotaping of the procedures to be performed, including appropriate portions of my body for medical, scientific, or educational purposes, provided my identity is not revealed by the pictures or by descriptive texts accompanying them. If the procedure has been photographed/videotaped, the physician will obtain the original picture, image, videotape or CD. The hospital will not be responsible for storage, release or maintenance of the picture, image, tape or CD.   7.  I consent to the presence of a  or observers in the operating room as deemed necessary by my physician or their designees.    8.  I recognize that in the event my procedure results in extended X-Ray/fluoroscopy time, I may develop a skin reaction.    9.  If I have a Do Not Attempt Resuscitation  (DNAR) order in place, that status will be suspended while in the operating room, procedural suite, and during the recovery period unless otherwise explicitly stated by me (or a person authorized to consent on my behalf). The performing physician or my attending physician will determine when the applicable recovery period ends for purposes of reinstating the DNAR order.  10.  I acknowledge that my physician has explained sedation/analgesia administration to me including the risk and benefits I consent to the administration of sedation/analgesia as may be necessary or desirable in the judgment of my physician.      I CERTIFY THAT I HAVE READ AND FULLY UNDERSTAND THE ABOVE CONSENT FOR THE PROCEDURE.   Signature of Patient: _____________________________________________________________  Responsible person in case of minor, unconscious: ____________________________________  Relationship to patient:  __________________________________________________________  Signature of Witness: _______________________________Date: _________Time: __________    Statement of Physician: My signature below affirms that prior to the time of the procedure, I have explained to the patient and/or her guardian, the risks and benefits involved in the proposed treatment and any reasonable alternative to the proposed treatment. I have also explained the risks and benefits involved in the refusal of the proposed treatment and have answered the patient's questions. If I have a significant financial interest in a co-management agreement or a significant financial interest in any product or implant, or other significant relationship used in the procedure/surgery, I have disclosed this and had a discussion with my patient.  Signature of Physician:   _________________________________Date:_____________Time:________    Patient Name: Cynthia Larson : 10/28/1950  Printed: May 24, 2024   Medical Record #: [id number]

## (undated) NOTE — LETTER
[facility]  [address]  [address]  [facility] [address]  [phone]  Authorization for Imaging Procedure  Date of Procedure:     1. I hereby authorize Dr. Galina Andujar , my physician and his/her assistants (if applicable), which may include medical students, residents, and/or fellows, to perform the following procedure and administer such anesthesia as may be determined necessary by my physician Ultrasound guidied left breast  Biopsy with clip placement  on Jacquelene Head. 2.  I recognize that during the procedure, unforeseen conditions may necessitate additional or different procedures than those listed above. I, therefore, further authorize and request that the above-named physician, assistants, or designees perform such procedures as are, in their judgment, necessary and desirable. 3.  My physician has discussed prior to my procedure the potential benefits, risks and side effects of this procedure; the likelihood of achieving goals; and potential problems that might occur during recuperation. They also discussed reasonable alternatives to the procedure, including risks, benefits, and side effects related to the alternatives and risks related to not receiving this procedure. I have had all my questions answered and I acknowledge that no guarantee has been made as to the result that may be obtained. 4.  Should the need arise during my procedure, which includes change of level of care prior to discharge, I also consent to the administration of blood and/or blood products. Further, I understand that despite careful testing and screening of blood or blood products by collecting agencies, I may still be subject to ill effects as a result of receiving a blood transfusion and/or blood products.  The following are some, but not all, of the potential risks that can occur: fever and allergic reactions, hemolytic reactions, transmission of diseases such as Hepatitis, AIDS and Cytomegalovirus (CMV) and fluid overload. In the event that I wish to have an autologous transfusion of my own blood, or a directed donor transfusion, I will discuss this with my physician. Check only if Refusing Blood or Blood Products  I understand refusal of blood or blood products as deemed necessary by my physician may have serious consequences to my condition to include possible death. I hereby assume responsibility for my refusal and release the hospital, its personnel, and my physicians from any responsibility for the consequences of my refusal.   [  ] Patient Refuses Blood      5. I authorize the use of any specimen, organs, tissues, body parts or foreign objects that may be removed from my body during the procedure for diagnosis, research or teaching purposes and their subsequent disposal by hospital authorities. I also authorize the release of specimen test results and/or written reports to my treating physician on the hospital medical staff or other referring or consulting physicians involved in my care, at the discretion of the Pathologist or my treating physician. 6.  I consent to the photographing or videotaping of the procedures to be performed, including appropriate portions of my body for medical, scientific, or educational purposes, provided my identity is not revealed by the pictures or by descriptive texts accompanying them. If the procedure has been photographed/videotaped, the physician will obtain the original picture, image, videotape or CD. The hospital will not be responsible for storage, release or maintenance of the picture, image, tape or CD.   7.  I consent to the presence of a  or observers in the operating room as deemed necessary by my physician or their designees. 8.  I recognize that in the event my procedure results in extended X-Ray/fluoroscopy time, I may develop a skin reaction. 9.   If I have a Do Not Attempt Resuscitation (DNAR) order in place, that status will be suspended while in the operating room, procedural suite, and during the recovery period unless otherwise explicitly stated by me (or a person authorized to consent on my behalf). The performing physician or my attending physician will determine when the applicable recovery period ends for purposes of reinstating the DNAR order. 10.  I acknowledge that my physician has explained sedation/analgesia administration to me including the risk and benefits I consent to the administration of sedation/analgesia as may be necessary or desirable in the judgment of my physician. I CERTIFY THAT I HAVE READ AND FULLY UNDERSTAND THE ABOVE CONSENT FOR THE PROCEDURE.    Signature of Patient: _____________________________________________________________  Responsible person in case of minor, unconscious: ____________________________________  Relationship to patient:  __________________________________________________________    Signature of Witness: _______________________________Date: _________Time: __________    Patient Name: Brendon Hernadez : 10/28/1950  Printed: 2022   Medical Record #: [id number]

## (undated) NOTE — LETTER
Patient Name: Siena Arthur CSN: [id number]  -Age / Sex: 10/28/1950-A: 67 y  female Medical Records: [id number]    The above patient had a positive COVID test on: __2022__________      Per [facility] Infection Control guidelines this patient will NOT be retested for COVID  prior to their surgery/procedure on: ______11/15/2022________. Thank you. Gretel MONREAL

## (undated) NOTE — Clinical Note
Doing well on Letrozole so far, no S/E. Will get baseline bone density scheduled soon. PT seeing for axillary cording.